# Patient Record
Sex: FEMALE | Race: WHITE | NOT HISPANIC OR LATINO | Employment: OTHER | URBAN - NONMETROPOLITAN AREA
[De-identification: names, ages, dates, MRNs, and addresses within clinical notes are randomized per-mention and may not be internally consistent; named-entity substitution may affect disease eponyms.]

---

## 2017-05-01 ENCOUNTER — OFFICE VISIT (OUTPATIENT)
Dept: CARDIOLOGY | Facility: CLINIC | Age: 57
End: 2017-05-01

## 2017-05-01 VITALS
SYSTOLIC BLOOD PRESSURE: 122 MMHG | BODY MASS INDEX: 26.79 KG/M2 | WEIGHT: 160.8 LBS | HEART RATE: 85 BPM | OXYGEN SATURATION: 98 % | HEIGHT: 65 IN | DIASTOLIC BLOOD PRESSURE: 81 MMHG

## 2017-05-01 DIAGNOSIS — R07.89 ATYPICAL CHEST PAIN: ICD-10-CM

## 2017-05-01 DIAGNOSIS — E78.2 MIXED HYPERLIPIDEMIA: ICD-10-CM

## 2017-05-01 DIAGNOSIS — R00.2 PALPITATIONS: Primary | ICD-10-CM

## 2017-05-01 DIAGNOSIS — Z72.0 TOBACCO USE: ICD-10-CM

## 2017-05-01 PROCEDURE — 99213 OFFICE O/P EST LOW 20 MIN: CPT | Performed by: INTERNAL MEDICINE

## 2017-05-01 RX ORDER — ESCITALOPRAM OXALATE 20 MG/1
20 TABLET ORAL DAILY
COMMUNITY
End: 2022-01-13 | Stop reason: ALTCHOICE

## 2017-05-03 PROBLEM — R00.2 PALPITATIONS: Status: ACTIVE | Noted: 2017-05-03

## 2017-10-24 ENCOUNTER — TELEPHONE (OUTPATIENT)
Dept: CARDIOLOGY | Facility: CLINIC | Age: 57
End: 2017-10-24

## 2017-10-24 NOTE — TELEPHONE ENCOUNTER
----- Message from Zia Coelho MD sent at 10/23/2017  1:44 PM EDT -----  It is okay to stop aspirin  ----- Message -----     From: Linda Mcintosh MA     Sent: 10/23/2017  11:51 AM       To: Zia Coelho MD    Pt is having hysterectomy done Dr. Topete office requesting that she stop asa x 7 days prior to procedure.            Attn to Krupa at Dr. Topete office

## 2017-10-26 ENCOUNTER — TELEPHONE (OUTPATIENT)
Dept: CARDIOLOGY | Facility: CLINIC | Age: 57
End: 2017-10-26

## 2017-10-26 DIAGNOSIS — R00.2 PALPITATIONS: Primary | ICD-10-CM

## 2017-10-26 NOTE — TELEPHONE ENCOUNTER
----- Message from Zia Coelho MD sent at 10/26/2017 11:43 AM EDT -----  Contact: [phone] Krupa ext [phone]  She will not need cardiac clearance but order an echo  ----- Message -----     From: Linda Mcintosh MA     Sent: 10/26/2017  10:40 AM       To: MD Dr. Yoselyn Awad office wanting to know if Ms. Concepcion will need cardiac clearance. She said pt told her that she has a leaky valve so they were requesting clearance but I didn't see it in her chart?

## 2017-11-06 ENCOUNTER — APPOINTMENT (OUTPATIENT)
Dept: PREADMISSION TESTING | Facility: HOSPITAL | Age: 57
End: 2017-11-06

## 2017-11-17 ENCOUNTER — HOSPITAL ENCOUNTER (OUTPATIENT)
Dept: CARDIOLOGY | Facility: HOSPITAL | Age: 57
Discharge: HOME | End: 2017-11-17
Attending: INTERNAL MEDICINE | Admitting: INTERNAL MEDICINE

## 2017-11-17 LAB
BH CV ECHO MEAS - % IVS THICK: 45.6 %
BH CV ECHO MEAS - % LVPW THICK: 90.1 %
BH CV ECHO MEAS - ACS: 2.3 CM
BH CV ECHO MEAS - AO ROOT AREA (BSA CORRECTED): 2.1
BH CV ECHO MEAS - AO ROOT AREA: 10.8 CM^2
BH CV ECHO MEAS - AO ROOT DIAM: 3.7 CM
BH CV ECHO MEAS - BSA(HAYCOCK): 1.8 M^2
BH CV ECHO MEAS - BSA: 1.8 M^2
BH CV ECHO MEAS - BZI_BMI: 26.6 KILOGRAMS/M^2
BH CV ECHO MEAS - BZI_METRIC_HEIGHT: 165.1 CM
BH CV ECHO MEAS - BZI_METRIC_WEIGHT: 72.6 KG
BH CV ECHO MEAS - CONTRAST EF 4CH: 58 ML/M^2
BH CV ECHO MEAS - EDV(CUBED): 114.4 ML
BH CV ECHO MEAS - EDV(MOD-SP4): 50 ML
BH CV ECHO MEAS - EDV(TEICH): 110.4 ML
BH CV ECHO MEAS - EF(CUBED): 74.1 %
BH CV ECHO MEAS - EF(MOD-SP4): 58 %
BH CV ECHO MEAS - EF(TEICH): 65.9 %
BH CV ECHO MEAS - ESV(CUBED): 29.6 ML
BH CV ECHO MEAS - ESV(MOD-SP4): 21 ML
BH CV ECHO MEAS - ESV(TEICH): 37.7 ML
BH CV ECHO MEAS - FS: 36.3 %
BH CV ECHO MEAS - IVS/LVPW: 1
BH CV ECHO MEAS - IVSD: 1.1 CM
BH CV ECHO MEAS - IVSS: 1.5 CM
BH CV ECHO MEAS - LA DIMENSION: 3.6 CM
BH CV ECHO MEAS - LA/AO: 0.97
BH CV ECHO MEAS - LV DIASTOLIC VOL/BSA (35-75): 27.8 ML/M^2
BH CV ECHO MEAS - LV MASS(C)D: 184.3 GRAMS
BH CV ECHO MEAS - LV MASS(C)DI: 102.5 GRAMS/M^2
BH CV ECHO MEAS - LV MASS(C)S: 215.3 GRAMS
BH CV ECHO MEAS - LV MASS(C)SI: 119.7 GRAMS/M^2
BH CV ECHO MEAS - LV SYSTOLIC VOL/BSA (12-30): 11.7 ML/M^2
BH CV ECHO MEAS - LVIDD: 4.9 CM
BH CV ECHO MEAS - LVIDS: 3.1 CM
BH CV ECHO MEAS - LVLD AP4: 7.6 CM
BH CV ECHO MEAS - LVLS AP4: 6.7 CM
BH CV ECHO MEAS - LVOT AREA (M): 2.8 CM^2
BH CV ECHO MEAS - LVOT AREA: 3 CM^2
BH CV ECHO MEAS - LVOT DIAM: 1.9 CM
BH CV ECHO MEAS - LVPWD: 1 CM
BH CV ECHO MEAS - LVPWS: 2 CM
BH CV ECHO MEAS - MV A MAX VEL: 57.8 CM/SEC
BH CV ECHO MEAS - MV E MAX VEL: 70.1 CM/SEC
BH CV ECHO MEAS - MV E/A: 1.2
BH CV ECHO MEAS - PA ACC SLOPE: 468.8 CM/SEC^2
BH CV ECHO MEAS - PA ACC TIME: 0.14 SEC
BH CV ECHO MEAS - PA PR(ACCEL): 15.6 MMHG
BH CV ECHO MEAS - RVDD: 1.9 CM
BH CV ECHO MEAS - SI(CUBED): 47.1 ML/M^2
BH CV ECHO MEAS - SI(MOD-SP4): 16.1 ML/M^2
BH CV ECHO MEAS - SI(TEICH): 40.4 ML/M^2
BH CV ECHO MEAS - SV(CUBED): 84.8 ML
BH CV ECHO MEAS - SV(MOD-SP4): 29 ML
BH CV ECHO MEAS - SV(TEICH): 72.7 ML
LV EF 2D ECHO EST: 60 %
MAXIMAL PREDICTED HEART RATE: 164 BPM
STRESS TARGET HR: 139 BPM

## 2017-11-17 PROCEDURE — 93306 TTE W/DOPPLER COMPLETE: CPT | Performed by: INTERNAL MEDICINE

## 2017-11-17 PROCEDURE — 93306 TTE W/DOPPLER COMPLETE: CPT

## 2017-11-20 ENCOUNTER — OFFICE VISIT (OUTPATIENT)
Dept: CARDIOLOGY | Facility: CLINIC | Age: 57
End: 2017-11-20

## 2017-11-20 VITALS
BODY MASS INDEX: 24.93 KG/M2 | HEART RATE: 74 BPM | WEIGHT: 149.6 LBS | SYSTOLIC BLOOD PRESSURE: 114 MMHG | HEIGHT: 65 IN | OXYGEN SATURATION: 95 % | DIASTOLIC BLOOD PRESSURE: 68 MMHG

## 2017-11-20 DIAGNOSIS — R00.2 PALPITATIONS: Primary | ICD-10-CM

## 2017-11-20 DIAGNOSIS — Z72.0 TOBACCO USE: ICD-10-CM

## 2017-11-20 DIAGNOSIS — R07.89 ATYPICAL CHEST PAIN: ICD-10-CM

## 2017-11-20 PROCEDURE — 99214 OFFICE O/P EST MOD 30 MIN: CPT | Performed by: INTERNAL MEDICINE

## 2017-11-20 RX ORDER — SERTRALINE HYDROCHLORIDE 25 MG/1
25 TABLET, FILM COATED ORAL DAILY
COMMUNITY
End: 2019-12-11 | Stop reason: ALTCHOICE

## 2017-11-20 NOTE — PROGRESS NOTES
subjective     Chief Complaint   Patient presents with   • Follow-up   • Hyperlipidemia     History of Present Illness  Patient is 57 years old white female who is here for 6 months cardiology follow-up.  Patient has no known coronary artery disease.  She had a stress test done in March 2016 which was negative for significant exercise-induced myocardial ischemia.  Patient has fast heartbeat and is taking metoprolol.  She sees be doing very well at this time and denies any palpitations.  Heart rate is around 70/m.  Blood pressure is also normal.    Patient continues to smoke about one half pack of cigarettes daily.  She has hyperlipidemia and is taking Pravachol daily and to directions of Dr. Rosario.      Past Surgical History:   Procedure Laterality Date   • CHOLECYSTECTOMY     • COLONOSCOPY  2014   • ENDOSCOPY  2014   • LUNG SURGERY      biopsy     Family History   Problem Relation Age of Onset   • Coronary artery disease Mother    • Heart attack Mother    • Diabetes Mother    • Heart disease Other    • Heart disease Father    • Hypertension Father    • Cancer Father    • No Known Problems Sister    • Diabetes Brother    • No Known Problems Sister    • No Known Problems Sister    • No Known Problems Sister    • Arthritis Brother      Past Medical History:   Diagnosis Date   • Ankle edema    • Asthmatic bronchitis    • Atypical chest pain    • COPD (chronic obstructive pulmonary disease)    • Family history of premature coronary artery disease    • GERD (gastroesophageal reflux disease)    • Hiatal hernia    • History of EKG 09/29/2015    NORMAL   • Hyperlipidemia    • Lung nodules    • Obesity    • Tobacco abuse      Patient Active Problem List   Diagnosis   • Hyperlipidemia   • GERD (gastroesophageal reflux disease)   • Tobacco use   • Obesity   • COPD (chronic obstructive pulmonary disease)   • Atypical chest pain   • Palpitations       Social History   Substance Use Topics   • Smoking status: Current Every Day  Smoker     Packs/day: 1.50   • Smokeless tobacco: None   • Alcohol use No       No Known Allergies    Current Outpatient Prescriptions on File Prior to Visit   Medication Sig   • albuterol (PROVENTIL HFA;VENTOLIN HFA) 108 (90 BASE) MCG/ACT inhaler Inhale 2 (two) times a day.   • alendronate (FOSAMAX) 70 MG tablet Take 1 tablet by mouth 1 (one) time per week.   • aspirin 81 MG tablet Take 1 tablet by mouth daily.   • diazepam (VALIUM) 5 MG tablet Take 5 mg by mouth 3 (three) times a day.   • diclofenac (VOLTAREN) 1 % gel gel Apply 4 g topically. Bid - tid prn   • escitalopram (LEXAPRO) 20 MG tablet Take 20 mg by mouth Daily.   • furosemide (LASIX) 20 MG tablet Take 10 mg by mouth daily as needed.   • gabapentin (NEURONTIN) 600 MG tablet Take 600 mg by mouth 2 (two) times a day.   • HYDROcodone-acetaminophen (NORCO)  MG per tablet Take 1 tablet by mouth 3 (three) times a day.   • hydrOXYzine (ATARAX) 25 MG tablet Take 25 mg by mouth every night.   • meloxicam (MOBIC) 15 MG tablet Take 15 mg by mouth daily.   • metoclopramide (REGLAN) 5 MG tablet Take 10 mg by mouth daily as needed.   • metoprolol tartrate (LOPRESSOR) 25 MG tablet take 1 tablet by mouth once daily   • montelukast (SINGULAIR) 10 MG tablet Take 10 mg by mouth every night.   • pantoprazole (PROTONIX) 40 MG EC tablet Take 40 mg by mouth daily.   • pravastatin (PRAVACHOL) 40 MG tablet Take 40 mg by mouth daily.   • PredniSONE (DELTASONE) 10 MG (21) tablet pack Take  by mouth Daily. Use as directed on package   • rOPINIRole (REQUIP) 0.25 MG tablet Take 0.25 mg by mouth every night. Take 1 hour before bedtime.   • vitamin B-12 (CYANOCOBALAMIN) 1000 MCG tablet Take 1,000 mcg by mouth daily.   • azithromycin (ZITHROMAX) 250 MG tablet Take 250 mg by mouth Daily. Take 2 tablets the first day, then 1 tablet daily for 4 days.     No current facility-administered medications on file prior to visit.          The following portions of the patient's history were  "reviewed and updated as appropriate: allergies, current medications, past family history, past medical history, past social history, past surgical history and problem list.    Review of Systems   Constitution: Negative.   HENT: Negative.  Negative for congestion.    Eyes: Negative.    Cardiovascular: Negative.  Negative for chest pain, cyanosis, dyspnea on exertion, irregular heartbeat, leg swelling, near-syncope, orthopnea, palpitations, paroxysmal nocturnal dyspnea and syncope.   Respiratory: Negative.  Negative for shortness of breath.    Hematologic/Lymphatic: Negative.    Musculoskeletal: Negative.    Gastrointestinal: Negative.    Neurological: Negative.  Negative for headaches.          Objective:     /68 (BP Location: Left arm, Patient Position: Sitting)  Pulse 74  Ht 65\" (165.1 cm)  Wt 149 lb 9.6 oz (67.9 kg)  SpO2 95%  BMI 24.89 kg/m2  Physical Exam   Constitutional: She appears well-developed and well-nourished. No distress.   HENT:   Head: Normocephalic and atraumatic.   Mouth/Throat: Oropharynx is clear and moist. No oropharyngeal exudate.   Eyes: Conjunctivae and EOM are normal. Pupils are equal, round, and reactive to light. No scleral icterus.   Neck: Normal range of motion. Neck supple. No JVD present. No tracheal deviation present. No thyromegaly present.   Cardiovascular: Normal rate, regular rhythm, normal heart sounds and intact distal pulses.  PMI is not displaced.  Exam reveals no gallop, no friction rub and no decreased pulses.    No murmur heard.  Pulses:       Carotid pulses are 3+ on the right side, and 3+ on the left side.       Radial pulses are 3+ on the right side, and 3+ on the left side.   Pulmonary/Chest: Effort normal and breath sounds normal. No respiratory distress. She has no wheezes. She has no rales. She exhibits no tenderness.   Abdominal: Soft. Bowel sounds are normal. She exhibits no distension, no abdominal bruit and no mass. There is no splenomegaly or " hepatomegaly. There is no tenderness. There is no rebound and no guarding.   Musculoskeletal: Normal range of motion. She exhibits no edema, tenderness or deformity.   Lymphadenopathy:     She has no cervical adenopathy.   Neurological: She is alert. She has normal reflexes. No cranial nerve deficit. She exhibits normal muscle tone. Coordination normal.   Skin: Skin is warm and dry. No rash noted. She is not diaphoretic. No erythema.   Psychiatric: She has a normal mood and affect. Her behavior is normal. Judgment and thought content normal.         Lab Review        ECG 12 Lead  Date/Time: 11/25/2017 1:45 PM  Performed by: JOSSUE PEREZ  Authorized by: JOSSUE PEREZ   Comparison: compared with previous ECG from 6/4/2016  Similar to previous ECG  Rhythm: sinus rhythm  Rate: normal  Conduction: conduction normal  ST Segments: ST segments normal  T Waves: T waves normal  QRS axis: normal  Other: no other findings  Clinical impression: normal ECG               I personally viewed and interpreted the patient's LAB data         Assessment:     1. Palpitations    2. Tobacco use    3. Atypical chest pain          Plan:     Patient has history of fast heartbeat.  Is very well controlled with low-dose Lopressor.  Patient had history of atypical chest pain in the past had cardiac workup with a normal stress test in March 2016.  Currently she is not having any symptoms and is doing very well.  EKG today is also normal.  Unfortunately she continues to smoke one half pack of cigarettes daily.    From cardiac standpoint she seems to be doing very well and is stable.  It is okay for her to stop aspirin prior to surgery.        Return in about 6 months (around 5/20/2018).

## 2017-11-25 PROCEDURE — 93000 ELECTROCARDIOGRAM COMPLETE: CPT | Performed by: INTERNAL MEDICINE

## 2018-01-09 ENCOUNTER — PREP FOR SURGERY (OUTPATIENT)
Dept: OTHER | Facility: HOSPITAL | Age: 58
End: 2018-01-09

## 2018-01-09 DIAGNOSIS — D27.1 OVARIAN TUMOR (BENIGN), LEFT: Primary | ICD-10-CM

## 2018-01-09 RX ORDER — SODIUM CHLORIDE 0.9 % (FLUSH) 0.9 %
1-10 SYRINGE (ML) INJECTION AS NEEDED
Status: CANCELLED | OUTPATIENT
Start: 2018-01-16

## 2018-01-11 ENCOUNTER — APPOINTMENT (OUTPATIENT)
Dept: PREADMISSION TESTING | Facility: HOSPITAL | Age: 58
End: 2018-01-11

## 2018-01-11 DIAGNOSIS — D27.1 OVARIAN TUMOR (BENIGN), LEFT: ICD-10-CM

## 2018-01-11 LAB
ABO GROUP BLD: NORMAL
ANION GAP SERPL CALCULATED.3IONS-SCNC: 3.3 MMOL/L (ref 3.6–11.2)
BASOPHILS # BLD AUTO: 0.1 10*3/MM3 (ref 0–0.3)
BASOPHILS NFR BLD AUTO: 1 % (ref 0–2)
BLD GP AB SCN SERPL QL: NEGATIVE
BUN BLD-MCNC: 6 MG/DL (ref 7–21)
BUN/CREAT SERPL: 7.6 (ref 7–25)
CALCIUM SPEC-SCNC: 9 MG/DL (ref 7.7–10)
CHLORIDE SERPL-SCNC: 107 MMOL/L (ref 99–112)
CO2 SERPL-SCNC: 30.7 MMOL/L (ref 24.3–31.9)
CREAT BLD-MCNC: 0.79 MG/DL (ref 0.43–1.29)
DEPRECATED RDW RBC AUTO: 46.9 FL (ref 37–54)
EOSINOPHIL # BLD AUTO: 0.23 10*3/MM3 (ref 0–0.7)
EOSINOPHIL NFR BLD AUTO: 2.2 % (ref 0–5)
ERYTHROCYTE [DISTWIDTH] IN BLOOD BY AUTOMATED COUNT: 14.4 % (ref 11.5–14.5)
GFR SERPL CREATININE-BSD FRML MDRD: 75 ML/MIN/1.73
GLUCOSE BLD-MCNC: 80 MG/DL (ref 70–110)
HCT VFR BLD AUTO: 42.3 % (ref 37–47)
HGB BLD-MCNC: 13.6 G/DL (ref 12–16)
IMM GRANULOCYTES # BLD: 0.02 10*3/MM3 (ref 0–0.03)
IMM GRANULOCYTES NFR BLD: 0.2 % (ref 0–0.5)
LYMPHOCYTES # BLD AUTO: 3.54 10*3/MM3 (ref 1–3)
LYMPHOCYTES NFR BLD AUTO: 33.8 % (ref 21–51)
MCH RBC QN AUTO: 29.8 PG (ref 27–33)
MCHC RBC AUTO-ENTMCNC: 32.2 G/DL (ref 33–37)
MCV RBC AUTO: 92.6 FL (ref 80–94)
MONOCYTES # BLD AUTO: 0.66 10*3/MM3 (ref 0.1–0.9)
MONOCYTES NFR BLD AUTO: 6.3 % (ref 0–10)
NEUTROPHILS # BLD AUTO: 5.91 10*3/MM3 (ref 1.4–6.5)
NEUTROPHILS NFR BLD AUTO: 56.5 % (ref 30–70)
OSMOLALITY SERPL CALC.SUM OF ELEC: 277.8 MOSM/KG (ref 273–305)
PLATELET # BLD AUTO: 263 10*3/MM3 (ref 130–400)
PMV BLD AUTO: 10.8 FL (ref 6–10)
POTASSIUM BLD-SCNC: 4.1 MMOL/L (ref 3.5–5.3)
RBC # BLD AUTO: 4.57 10*6/MM3 (ref 4.2–5.4)
RH BLD: POSITIVE
SODIUM BLD-SCNC: 141 MMOL/L (ref 135–153)
WBC NRBC COR # BLD: 10.46 10*3/MM3 (ref 4.5–12.5)

## 2018-01-11 PROCEDURE — 86900 BLOOD TYPING SEROLOGIC ABO: CPT | Performed by: OBSTETRICS & GYNECOLOGY

## 2018-01-11 PROCEDURE — 80048 BASIC METABOLIC PNL TOTAL CA: CPT | Performed by: OBSTETRICS & GYNECOLOGY

## 2018-01-11 PROCEDURE — 85025 COMPLETE CBC W/AUTO DIFF WBC: CPT | Performed by: OBSTETRICS & GYNECOLOGY

## 2018-01-11 PROCEDURE — 86850 RBC ANTIBODY SCREEN: CPT | Performed by: OBSTETRICS & GYNECOLOGY

## 2018-01-11 PROCEDURE — 86901 BLOOD TYPING SEROLOGIC RH(D): CPT | Performed by: OBSTETRICS & GYNECOLOGY

## 2018-01-11 PROCEDURE — 36415 COLL VENOUS BLD VENIPUNCTURE: CPT

## 2018-01-11 RX ORDER — LORATADINE 10 MG/1
10 TABLET ORAL DAILY
COMMUNITY
End: 2019-12-11 | Stop reason: ALTCHOICE

## 2018-01-11 RX ORDER — HYDROXYZINE PAMOATE 25 MG/1
25 CAPSULE ORAL 2 TIMES DAILY
COMMUNITY
End: 2019-06-26 | Stop reason: SDUPTHER

## 2018-01-11 RX ORDER — ERGOCALCIFEROL 1.25 MG/1
50000 CAPSULE ORAL
COMMUNITY
End: 2022-01-13 | Stop reason: ALTCHOICE

## 2018-01-11 RX ORDER — METOCLOPRAMIDE 10 MG/1
10 TABLET ORAL DAILY
COMMUNITY

## 2018-01-11 NOTE — DISCHARGE INSTRUCTIONS
TAKE the following medications the morning of surgery:  All heart or blood pressure medications    Please discontinue all blood thinners and anticoagulants (except aspirin) prior to surgery as per your surgeon and cardiologist instructions.  Aspirin may be continued up to the day prior to surgery.    HOLD all diabetic medications the morning of surgery as order by physician.    Please follow instructions on use of prep cloths provided by nurse. Return instruction sheet with stickers attached to pre-op nurse on day of surgery.    Arrival time for surgery on 1/16/2018 is 1030 am.    General Instructions:  • Do NOT eat or drink after midnight 1/15/2018 which includes water, mints, or gum.  • You may brush your teeth. Dental appliances that are removable must be taken out day of surgery.  • Do NOT smoke, chew tobacco, or drink alcohol within 24 hours prior to surgery.  • Bring medications in original bottles, any inhalers and if applicable your C-PAP/BI-PAP machine  • Bring any papers given to you in the doctor’s office  • Wear clean, comfortable clothes and socks  • Do NOT wear contact lenses or make-up or dark nail polish.  Bring a case for your glasses if applicable.  • Bring crutches or walker if applicable  • Leave all other valuables and jewelry at home  • If you were given a blood bank armband, continue to wear it until discharged.    Preventing a Surgical Site Infection:  • Shower on the morning of surgery using a fresh bar of anti-bacterial soap (such as Dial) and clean washcloth.  Dry with a clean towel and dress in clean clothing.  • For 2 to 3 days before surgery, avoid shaving with a razor near where you will have surgery because the razor can irritate skin and make it easier to develop an infection.  Ask your surgeon if you will be receiving antibiotics prior to surgery.  • Make sure you, your family, and all healthcare providers clean their hands with soap and water or an alcohol-based hand   before caring for you or your wound.  • If at all possible, quit smoking as many days before surgery as you can.    Day of Surgery:  Upon arrival, a pre-op nurse and anesthesiologist will review your health history, obtain vital signs, and answer questions you may have.  The only belongings needed at this time will be your home medications and if applicable you C-PAP/BI-PAP machine.  If you are staying overnight, your family can leave the rest of your belongings in the car and bring them to your room later.  A pre-op nurse will start an IV and you may receive medication in preparation for surgery.  Due to patient privacy and limited space, only one member of your family will be able to accompany you in the pre-op area.  While you are in surgery your family should notify the waiting room  if they leave the waiting room area and provide a contact number.  Please be aware that surgery does come with discomfort.  We want to make every effort to control your discomfort so please discuss any uncontrolled symptoms with your nurse.  Your doctor will most likely have prescribed pain medications.  If you are going home after surgery you will receive individualized written care instructions before being discharged.  A responsible adult must drive you to and from the hospital on the day of surgery and stay with you for 24 hours.  If you are staying overnight following surgery, you will be transported to your hospital room following the recovery period.

## 2018-01-16 ENCOUNTER — HOSPITAL ENCOUNTER (INPATIENT)
Facility: HOSPITAL | Age: 58
LOS: 1 days | Discharge: HOME | End: 2018-01-17
Attending: OBSTETRICS & GYNECOLOGY | Admitting: OBSTETRICS & GYNECOLOGY

## 2018-01-16 ENCOUNTER — ANESTHESIA EVENT (OUTPATIENT)
Dept: PERIOP | Facility: HOSPITAL | Age: 58
End: 2018-01-16

## 2018-01-16 ENCOUNTER — ANESTHESIA (OUTPATIENT)
Dept: PERIOP | Facility: HOSPITAL | Age: 58
End: 2018-01-16

## 2018-01-16 DIAGNOSIS — N93.9 ABNORMAL UTERINE BLEEDING (AUB): ICD-10-CM

## 2018-01-16 DIAGNOSIS — D27.1 OVARIAN TUMOR (BENIGN), LEFT: ICD-10-CM

## 2018-01-16 PROCEDURE — 0UT24ZZ RESECTION OF BILATERAL OVARIES, PERCUTANEOUS ENDOSCOPIC APPROACH: ICD-10-PCS | Performed by: OBSTETRICS & GYNECOLOGY

## 2018-01-16 PROCEDURE — 94799 UNLISTED PULMONARY SVC/PX: CPT

## 2018-01-16 PROCEDURE — 8E0W4CZ ROBOTIC ASSISTED PROCEDURE OF TRUNK REGION, PERCUTANEOUS ENDOSCOPIC APPROACH: ICD-10-PCS | Performed by: OBSTETRICS & GYNECOLOGY

## 2018-01-16 PROCEDURE — 25010000002 FENTANYL CITRATE (PF) 100 MCG/2ML SOLUTION: Performed by: NURSE ANESTHETIST, CERTIFIED REGISTERED

## 2018-01-16 PROCEDURE — 25010000002 ONDANSETRON PER 1 MG: Performed by: NURSE ANESTHETIST, CERTIFIED REGISTERED

## 2018-01-16 PROCEDURE — 88307 TISSUE EXAM BY PATHOLOGIST: CPT | Performed by: OBSTETRICS & GYNECOLOGY

## 2018-01-16 PROCEDURE — 0UT74ZZ RESECTION OF BILATERAL FALLOPIAN TUBES, PERCUTANEOUS ENDOSCOPIC APPROACH: ICD-10-PCS | Performed by: OBSTETRICS & GYNECOLOGY

## 2018-01-16 PROCEDURE — 25010000002 NEOSTIGMINE 10 MG/10ML SOLUTION: Performed by: NURSE ANESTHETIST, CERTIFIED REGISTERED

## 2018-01-16 PROCEDURE — 25010000002 HYDROMORPHONE PER 4 MG: Performed by: OBSTETRICS & GYNECOLOGY

## 2018-01-16 PROCEDURE — 0UT94ZZ RESECTION OF UTERUS, PERCUTANEOUS ENDOSCOPIC APPROACH: ICD-10-PCS | Performed by: OBSTETRICS & GYNECOLOGY

## 2018-01-16 PROCEDURE — 25010000002 KETOROLAC TROMETHAMINE PER 15 MG: Performed by: OBSTETRICS & GYNECOLOGY

## 2018-01-16 PROCEDURE — 25010000002 CEFOXITIN: Performed by: OBSTETRICS & GYNECOLOGY

## 2018-01-16 PROCEDURE — 25010000002 DEXAMETHASONE PER 1 MG: Performed by: NURSE ANESTHETIST, CERTIFIED REGISTERED

## 2018-01-16 PROCEDURE — 25010000002 PROPOFOL 10 MG/ML EMULSION: Performed by: NURSE ANESTHETIST, CERTIFIED REGISTERED

## 2018-01-16 RX ORDER — IPRATROPIUM BROMIDE AND ALBUTEROL SULFATE 2.5; .5 MG/3ML; MG/3ML
3 SOLUTION RESPIRATORY (INHALATION) ONCE AS NEEDED
Status: DISCONTINUED | OUTPATIENT
Start: 2018-01-16 | End: 2018-01-16 | Stop reason: HOSPADM

## 2018-01-16 RX ORDER — HYDROXYZINE HYDROCHLORIDE 25 MG/1
25 TABLET, FILM COATED ORAL 2 TIMES DAILY
Status: DISCONTINUED | OUTPATIENT
Start: 2018-01-16 | End: 2018-01-17 | Stop reason: HOSPADM

## 2018-01-16 RX ORDER — SODIUM CHLORIDE, SODIUM LACTATE, POTASSIUM CHLORIDE, CALCIUM CHLORIDE 600; 310; 30; 20 MG/100ML; MG/100ML; MG/100ML; MG/100ML
100 INJECTION, SOLUTION INTRAVENOUS CONTINUOUS
Status: DISCONTINUED | OUTPATIENT
Start: 2018-01-16 | End: 2018-01-17 | Stop reason: HOSPADM

## 2018-01-16 RX ORDER — ONDANSETRON 4 MG/1
4 TABLET, ORALLY DISINTEGRATING ORAL EVERY 6 HOURS PRN
Status: DISCONTINUED | OUTPATIENT
Start: 2018-01-16 | End: 2018-01-17 | Stop reason: HOSPADM

## 2018-01-16 RX ORDER — SODIUM CHLORIDE, SODIUM LACTATE, POTASSIUM CHLORIDE, CALCIUM CHLORIDE 600; 310; 30; 20 MG/100ML; MG/100ML; MG/100ML; MG/100ML
125 INJECTION, SOLUTION INTRAVENOUS CONTINUOUS
Status: DISCONTINUED | OUTPATIENT
Start: 2018-01-16 | End: 2018-01-16 | Stop reason: HOSPADM

## 2018-01-16 RX ORDER — LIDOCAINE HYDROCHLORIDE 20 MG/ML
INJECTION, SOLUTION INFILTRATION; PERINEURAL AS NEEDED
Status: DISCONTINUED | OUTPATIENT
Start: 2018-01-16 | End: 2018-01-16 | Stop reason: SURG

## 2018-01-16 RX ORDER — FUROSEMIDE 20 MG/1
20 TABLET ORAL DAILY
Status: DISCONTINUED | OUTPATIENT
Start: 2018-01-16 | End: 2018-01-17 | Stop reason: HOSPADM

## 2018-01-16 RX ORDER — METOCLOPRAMIDE 10 MG/1
10 TABLET ORAL DAILY
Status: CANCELLED | OUTPATIENT
Start: 2018-01-16

## 2018-01-16 RX ORDER — MONTELUKAST SODIUM 10 MG/1
10 TABLET ORAL NIGHTLY
Status: CANCELLED | OUTPATIENT
Start: 2018-01-16

## 2018-01-16 RX ORDER — LANOLIN ALCOHOL/MO/W.PET/CERES
1000 CREAM (GRAM) TOPICAL DAILY
Status: DISCONTINUED | OUTPATIENT
Start: 2018-01-16 | End: 2018-01-17 | Stop reason: HOSPADM

## 2018-01-16 RX ORDER — MEPERIDINE HYDROCHLORIDE 25 MG/ML
12.5 INJECTION INTRAMUSCULAR; INTRAVENOUS; SUBCUTANEOUS
Status: DISCONTINUED | OUTPATIENT
Start: 2018-01-16 | End: 2018-01-16 | Stop reason: HOSPADM

## 2018-01-16 RX ORDER — BUDESONIDE AND FORMOTEROL FUMARATE DIHYDRATE 160; 4.5 UG/1; UG/1
2 AEROSOL RESPIRATORY (INHALATION)
Status: CANCELLED | OUTPATIENT
Start: 2018-01-16

## 2018-01-16 RX ORDER — ESCITALOPRAM OXALATE 10 MG/1
20 TABLET ORAL DAILY
Status: DISCONTINUED | OUTPATIENT
Start: 2018-01-16 | End: 2018-01-17 | Stop reason: HOSPADM

## 2018-01-16 RX ORDER — NALOXONE HCL 0.4 MG/ML
0.1 VIAL (ML) INJECTION
Status: DISCONTINUED | OUTPATIENT
Start: 2018-01-16 | End: 2018-01-17 | Stop reason: HOSPADM

## 2018-01-16 RX ORDER — FENTANYL CITRATE 50 UG/ML
50 INJECTION, SOLUTION INTRAMUSCULAR; INTRAVENOUS
Status: DISCONTINUED | OUTPATIENT
Start: 2018-01-16 | End: 2018-01-16 | Stop reason: HOSPADM

## 2018-01-16 RX ORDER — MELOXICAM 7.5 MG/1
15 TABLET ORAL DAILY
Status: DISCONTINUED | OUTPATIENT
Start: 2018-01-16 | End: 2018-01-17 | Stop reason: HOSPADM

## 2018-01-16 RX ORDER — HYDROMORPHONE HYDROCHLORIDE 1 MG/ML
0.5 INJECTION, SOLUTION INTRAMUSCULAR; INTRAVENOUS; SUBCUTANEOUS
Status: DISCONTINUED | OUTPATIENT
Start: 2018-01-16 | End: 2018-01-17 | Stop reason: HOSPADM

## 2018-01-16 RX ORDER — BUDESONIDE AND FORMOTEROL FUMARATE DIHYDRATE 160; 4.5 UG/1; UG/1
2 AEROSOL RESPIRATORY (INHALATION)
Status: DISCONTINUED | OUTPATIENT
Start: 2018-01-16 | End: 2018-01-17 | Stop reason: HOSPADM

## 2018-01-16 RX ORDER — GLYCOPYRROLATE 0.2 MG/ML
INJECTION INTRAMUSCULAR; INTRAVENOUS AS NEEDED
Status: DISCONTINUED | OUTPATIENT
Start: 2018-01-16 | End: 2018-01-16 | Stop reason: SURG

## 2018-01-16 RX ORDER — ATORVASTATIN CALCIUM 10 MG/1
10 TABLET, FILM COATED ORAL NIGHTLY
Status: DISCONTINUED | OUTPATIENT
Start: 2018-01-16 | End: 2018-01-17 | Stop reason: HOSPADM

## 2018-01-16 RX ORDER — KETOROLAC TROMETHAMINE 30 MG/ML
15 INJECTION, SOLUTION INTRAMUSCULAR; INTRAVENOUS EVERY 6 HOURS PRN
Status: DISCONTINUED | OUTPATIENT
Start: 2018-01-16 | End: 2018-01-17 | Stop reason: HOSPADM

## 2018-01-16 RX ORDER — PANTOPRAZOLE SODIUM 40 MG/1
40 TABLET, DELAYED RELEASE ORAL 2 TIMES DAILY
Status: DISCONTINUED | OUTPATIENT
Start: 2018-01-16 | End: 2018-01-17 | Stop reason: HOSPADM

## 2018-01-16 RX ORDER — ALBUTEROL SULFATE 90 UG/1
2 AEROSOL, METERED RESPIRATORY (INHALATION) EVERY 6 HOURS PRN
Status: CANCELLED | OUTPATIENT
Start: 2018-01-16

## 2018-01-16 RX ORDER — OXYCODONE HYDROCHLORIDE AND ACETAMINOPHEN 5; 325 MG/1; MG/1
1 TABLET ORAL ONCE AS NEEDED
Status: DISCONTINUED | OUTPATIENT
Start: 2018-01-16 | End: 2018-01-16 | Stop reason: HOSPADM

## 2018-01-16 RX ORDER — GABAPENTIN 300 MG/1
600 CAPSULE ORAL EVERY 8 HOURS SCHEDULED
Status: DISCONTINUED | OUTPATIENT
Start: 2018-01-16 | End: 2018-01-17 | Stop reason: HOSPADM

## 2018-01-16 RX ORDER — PROPOFOL 10 MG/ML
VIAL (ML) INTRAVENOUS AS NEEDED
Status: DISCONTINUED | OUTPATIENT
Start: 2018-01-16 | End: 2018-01-16 | Stop reason: SURG

## 2018-01-16 RX ORDER — ONDANSETRON 4 MG/1
4 TABLET, FILM COATED ORAL EVERY 6 HOURS PRN
Status: DISCONTINUED | OUTPATIENT
Start: 2018-01-16 | End: 2018-01-17 | Stop reason: HOSPADM

## 2018-01-16 RX ORDER — ASPIRIN 81 MG/1
81 TABLET ORAL DAILY
Status: DISCONTINUED | OUTPATIENT
Start: 2018-01-17 | End: 2018-01-17 | Stop reason: HOSPADM

## 2018-01-16 RX ORDER — PANTOPRAZOLE SODIUM 40 MG/1
40 TABLET, DELAYED RELEASE ORAL 2 TIMES DAILY
Status: CANCELLED | OUTPATIENT
Start: 2018-01-16

## 2018-01-16 RX ORDER — FENTANYL CITRATE 50 UG/ML
INJECTION, SOLUTION INTRAMUSCULAR; INTRAVENOUS AS NEEDED
Status: DISCONTINUED | OUTPATIENT
Start: 2018-01-16 | End: 2018-01-16 | Stop reason: SURG

## 2018-01-16 RX ORDER — SODIUM CHLORIDE 0.9 % (FLUSH) 0.9 %
1-10 SYRINGE (ML) INJECTION AS NEEDED
Status: DISCONTINUED | OUTPATIENT
Start: 2018-01-16 | End: 2018-01-16 | Stop reason: HOSPADM

## 2018-01-16 RX ORDER — HYDROCODONE BITARTRATE AND ACETAMINOPHEN 10; 325 MG/1; MG/1
1 TABLET ORAL 3 TIMES DAILY
Status: CANCELLED | OUTPATIENT
Start: 2018-01-16

## 2018-01-16 RX ORDER — ATORVASTATIN CALCIUM 10 MG/1
10 TABLET, FILM COATED ORAL DAILY
Status: CANCELLED | OUTPATIENT
Start: 2018-01-16

## 2018-01-16 RX ORDER — ONDANSETRON 2 MG/ML
4 INJECTION INTRAMUSCULAR; INTRAVENOUS EVERY 6 HOURS PRN
Status: DISCONTINUED | OUTPATIENT
Start: 2018-01-16 | End: 2018-01-17 | Stop reason: HOSPADM

## 2018-01-16 RX ORDER — CETIRIZINE HYDROCHLORIDE 10 MG/1
10 TABLET ORAL DAILY
Status: DISCONTINUED | OUTPATIENT
Start: 2018-01-16 | End: 2018-01-17 | Stop reason: HOSPADM

## 2018-01-16 RX ORDER — LIDOCAINE HYDROCHLORIDE AND EPINEPHRINE 10; 10 MG/ML; UG/ML
INJECTION, SOLUTION INFILTRATION; PERINEURAL AS NEEDED
Status: DISCONTINUED | OUTPATIENT
Start: 2018-01-16 | End: 2018-01-16 | Stop reason: HOSPADM

## 2018-01-16 RX ORDER — FAMOTIDINE 10 MG/ML
INJECTION, SOLUTION INTRAVENOUS AS NEEDED
Status: DISCONTINUED | OUTPATIENT
Start: 2018-01-16 | End: 2018-01-16 | Stop reason: SURG

## 2018-01-16 RX ORDER — TRAMADOL HYDROCHLORIDE 50 MG/1
50 TABLET ORAL EVERY 6 HOURS PRN
Status: DISCONTINUED | OUTPATIENT
Start: 2018-01-16 | End: 2018-01-17 | Stop reason: HOSPADM

## 2018-01-16 RX ORDER — VECURONIUM BROMIDE 1 MG/ML
INJECTION, POWDER, LYOPHILIZED, FOR SOLUTION INTRAVENOUS AS NEEDED
Status: DISCONTINUED | OUTPATIENT
Start: 2018-01-16 | End: 2018-01-16 | Stop reason: SURG

## 2018-01-16 RX ORDER — MAGNESIUM HYDROXIDE 1200 MG/15ML
LIQUID ORAL AS NEEDED
Status: DISCONTINUED | OUTPATIENT
Start: 2018-01-16 | End: 2018-01-16 | Stop reason: HOSPADM

## 2018-01-16 RX ORDER — DEXAMETHASONE SODIUM PHOSPHATE 10 MG/ML
INJECTION INTRAMUSCULAR; INTRAVENOUS AS NEEDED
Status: DISCONTINUED | OUTPATIENT
Start: 2018-01-16 | End: 2018-01-16 | Stop reason: SURG

## 2018-01-16 RX ORDER — FUROSEMIDE 20 MG/1
20 TABLET ORAL DAILY
Status: CANCELLED | OUTPATIENT
Start: 2018-01-16

## 2018-01-16 RX ORDER — ONDANSETRON 2 MG/ML
INJECTION INTRAMUSCULAR; INTRAVENOUS AS NEEDED
Status: DISCONTINUED | OUTPATIENT
Start: 2018-01-16 | End: 2018-01-16 | Stop reason: SURG

## 2018-01-16 RX ORDER — ALBUTEROL SULFATE 90 UG/1
AEROSOL, METERED RESPIRATORY (INHALATION) AS NEEDED
Status: DISCONTINUED | OUTPATIENT
Start: 2018-01-16 | End: 2018-01-16 | Stop reason: SURG

## 2018-01-16 RX ORDER — MELOXICAM 7.5 MG/1
15 TABLET ORAL DAILY
Status: CANCELLED | OUTPATIENT
Start: 2018-01-16

## 2018-01-16 RX ORDER — ESCITALOPRAM OXALATE 10 MG/1
20 TABLET ORAL DAILY
Status: CANCELLED | OUTPATIENT
Start: 2018-01-16

## 2018-01-16 RX ORDER — GABAPENTIN 300 MG/1
600 CAPSULE ORAL EVERY 8 HOURS SCHEDULED
Status: CANCELLED | OUTPATIENT
Start: 2018-01-16

## 2018-01-16 RX ORDER — SERTRALINE HYDROCHLORIDE 25 MG/1
25 TABLET, FILM COATED ORAL DAILY
Status: CANCELLED | OUTPATIENT
Start: 2018-01-16

## 2018-01-16 RX ORDER — LANOLIN ALCOHOL/MO/W.PET/CERES
1000 CREAM (GRAM) TOPICAL DAILY
Status: CANCELLED | OUTPATIENT
Start: 2018-01-16

## 2018-01-16 RX ORDER — ONDANSETRON 2 MG/ML
4 INJECTION INTRAMUSCULAR; INTRAVENOUS ONCE AS NEEDED
Status: DISCONTINUED | OUTPATIENT
Start: 2018-01-16 | End: 2018-01-16 | Stop reason: HOSPADM

## 2018-01-16 RX ORDER — DIAZEPAM 5 MG/1
5 TABLET ORAL 3 TIMES DAILY
Status: DISCONTINUED | OUTPATIENT
Start: 2018-01-16 | End: 2018-01-17 | Stop reason: HOSPADM

## 2018-01-16 RX ORDER — HYDROCODONE BITARTRATE AND ACETAMINOPHEN 10; 325 MG/1; MG/1
1 TABLET ORAL 3 TIMES DAILY
Status: DISCONTINUED | OUTPATIENT
Start: 2018-01-16 | End: 2018-01-17 | Stop reason: HOSPADM

## 2018-01-16 RX ORDER — MONTELUKAST SODIUM 10 MG/1
10 TABLET ORAL NIGHTLY
Status: DISCONTINUED | OUTPATIENT
Start: 2018-01-16 | End: 2018-01-17 | Stop reason: HOSPADM

## 2018-01-16 RX ORDER — HYDROXYZINE HYDROCHLORIDE 25 MG/1
25 TABLET, FILM COATED ORAL 2 TIMES DAILY
Status: CANCELLED | OUTPATIENT
Start: 2018-01-16

## 2018-01-16 RX ORDER — DIAZEPAM 5 MG/1
5 TABLET ORAL 3 TIMES DAILY
Status: CANCELLED | OUTPATIENT
Start: 2018-01-16

## 2018-01-16 RX ORDER — SODIUM CHLORIDE 9 MG/ML
INJECTION, SOLUTION INTRAVENOUS AS NEEDED
Status: DISCONTINUED | OUTPATIENT
Start: 2018-01-16 | End: 2018-01-16 | Stop reason: HOSPADM

## 2018-01-16 RX ORDER — NEOSTIGMINE METHYLSULFATE 1 MG/ML
INJECTION, SOLUTION INTRAVENOUS AS NEEDED
Status: DISCONTINUED | OUTPATIENT
Start: 2018-01-16 | End: 2018-01-16 | Stop reason: SURG

## 2018-01-16 RX ORDER — SERTRALINE HYDROCHLORIDE 25 MG/1
25 TABLET, FILM COATED ORAL DAILY
Status: DISCONTINUED | OUTPATIENT
Start: 2018-01-16 | End: 2018-01-17 | Stop reason: HOSPADM

## 2018-01-16 RX ORDER — BUPIVACAINE HYDROCHLORIDE AND EPINEPHRINE 5; 5 MG/ML; UG/ML
INJECTION, SOLUTION EPIDURAL; INTRACAUDAL; PERINEURAL AS NEEDED
Status: DISCONTINUED | OUTPATIENT
Start: 2018-01-16 | End: 2018-01-16 | Stop reason: HOSPADM

## 2018-01-16 RX ORDER — CETIRIZINE HYDROCHLORIDE 10 MG/1
10 TABLET ORAL DAILY
Status: CANCELLED | OUTPATIENT
Start: 2018-01-16

## 2018-01-16 RX ORDER — METOCLOPRAMIDE 10 MG/1
10 TABLET ORAL DAILY
Status: DISCONTINUED | OUTPATIENT
Start: 2018-01-16 | End: 2018-01-17 | Stop reason: HOSPADM

## 2018-01-16 RX ORDER — ALBUTEROL SULFATE 2.5 MG/3ML
2.5 SOLUTION RESPIRATORY (INHALATION) EVERY 6 HOURS PRN
Status: DISCONTINUED | OUTPATIENT
Start: 2018-01-16 | End: 2018-01-17 | Stop reason: HOSPADM

## 2018-01-16 RX ADMIN — HYDROMORPHONE HYDROCHLORIDE 0.5 MG: 1 INJECTION, SOLUTION INTRAMUSCULAR; INTRAVENOUS; SUBCUTANEOUS at 12:52

## 2018-01-16 RX ADMIN — FUROSEMIDE 20 MG: 20 TABLET ORAL at 14:26

## 2018-01-16 RX ADMIN — SODIUM CHLORIDE, POTASSIUM CHLORIDE, SODIUM LACTATE AND CALCIUM CHLORIDE: 600; 310; 30; 20 INJECTION, SOLUTION INTRAVENOUS at 11:10

## 2018-01-16 RX ADMIN — PROPOFOL 150 MG: 10 INJECTION, EMULSION INTRAVENOUS at 10:22

## 2018-01-16 RX ADMIN — DIAZEPAM 5 MG: 5 TABLET ORAL at 16:11

## 2018-01-16 RX ADMIN — CETIRIZINE HYDROCHLORIDE 10 MG: 10 TABLET ORAL at 14:27

## 2018-01-16 RX ADMIN — KETOROLAC TROMETHAMINE 15 MG: 30 INJECTION, SOLUTION INTRAMUSCULAR; INTRAVENOUS at 12:52

## 2018-01-16 RX ADMIN — FENTANYL CITRATE 100 MCG: 50 INJECTION INTRAMUSCULAR; INTRAVENOUS at 10:22

## 2018-01-16 RX ADMIN — ATORVASTATIN CALCIUM 10 MG: 10 TABLET, FILM COATED ORAL at 20:19

## 2018-01-16 RX ADMIN — HYDROCODONE BITARTRATE AND ACETAMINOPHEN 1 TABLET: 10; 325 TABLET ORAL at 20:18

## 2018-01-16 RX ADMIN — FENTANYL CITRATE 50 MCG: 50 INJECTION INTRAMUSCULAR; INTRAVENOUS at 11:50

## 2018-01-16 RX ADMIN — FAMOTIDINE 20 MG: 10 INJECTION, SOLUTION INTRAVENOUS at 10:25

## 2018-01-16 RX ADMIN — ESCITALOPRAM 20 MG: 10 TABLET, FILM COATED ORAL at 14:26

## 2018-01-16 RX ADMIN — DEXAMETHASONE SODIUM PHOSPHATE 8 MG: 10 INJECTION INTRAMUSCULAR; INTRAVENOUS at 10:25

## 2018-01-16 RX ADMIN — GLYCOPYRROLATE 0.6 MG: 0.2 INJECTION, SOLUTION INTRAMUSCULAR; INTRAVENOUS at 11:08

## 2018-01-16 RX ADMIN — HYDROXYZINE 25 MG: 25 TABLET, FILM COATED ORAL at 14:26

## 2018-01-16 RX ADMIN — FENTANYL CITRATE 50 MCG: 50 INJECTION INTRAMUSCULAR; INTRAVENOUS at 10:55

## 2018-01-16 RX ADMIN — GABAPENTIN 600 MG: 300 CAPSULE ORAL at 14:27

## 2018-01-16 RX ADMIN — GABAPENTIN 600 MG: 300 CAPSULE ORAL at 20:19

## 2018-01-16 RX ADMIN — PANTOPRAZOLE SODIUM 40 MG: 40 TABLET, DELAYED RELEASE ORAL at 20:19

## 2018-01-16 RX ADMIN — VECURONIUM BROMIDE 4 MG: 1 INJECTION, POWDER, LYOPHILIZED, FOR SOLUTION INTRAVENOUS at 10:22

## 2018-01-16 RX ADMIN — HYDROCODONE BITARTRATE AND ACETAMINOPHEN 1 TABLET: 10; 325 TABLET ORAL at 16:11

## 2018-01-16 RX ADMIN — Medication 1000 MCG: at 14:26

## 2018-01-16 RX ADMIN — FENTANYL CITRATE 50 MCG: 50 INJECTION INTRAMUSCULAR; INTRAVENOUS at 11:40

## 2018-01-16 RX ADMIN — DIAZEPAM 5 MG: 5 TABLET ORAL at 20:18

## 2018-01-16 RX ADMIN — MONTELUKAST SODIUM 10 MG: 10 TABLET ORAL at 20:18

## 2018-01-16 RX ADMIN — CEFOXITIN 2 G: 2 INJECTION, POWDER, FOR SOLUTION INTRAVENOUS at 10:15

## 2018-01-16 RX ADMIN — LIDOCAINE HYDROCHLORIDE 40 MG: 20 INJECTION, SOLUTION INFILTRATION; PERINEURAL at 10:22

## 2018-01-16 RX ADMIN — ONDANSETRON 4 MG: 2 INJECTION, SOLUTION INTRAMUSCULAR; INTRAVENOUS at 10:25

## 2018-01-16 RX ADMIN — HYDROXYZINE 25 MG: 25 TABLET, FILM COATED ORAL at 20:18

## 2018-01-16 RX ADMIN — FENTANYL CITRATE 50 MCG: 50 INJECTION INTRAMUSCULAR; INTRAVENOUS at 10:48

## 2018-01-16 RX ADMIN — FENTANYL CITRATE 50 MCG: 50 INJECTION INTRAMUSCULAR; INTRAVENOUS at 11:08

## 2018-01-16 RX ADMIN — SERTRALINE HYDROCHLORIDE 25 MG: 25 TABLET ORAL at 14:26

## 2018-01-16 RX ADMIN — NEOSTIGMINE METHYLSULFATE 3 MG: 1 INJECTION, SOLUTION INTRAVENOUS at 11:08

## 2018-01-16 RX ADMIN — ALBUTEROL SULFATE 3 PUFF: 90 AEROSOL, METERED RESPIRATORY (INHALATION) at 10:15

## 2018-01-16 RX ADMIN — SODIUM CHLORIDE, POTASSIUM CHLORIDE, SODIUM LACTATE AND CALCIUM CHLORIDE 125 ML/HR: 600; 310; 30; 20 INJECTION, SOLUTION INTRAVENOUS at 09:26

## 2018-01-16 RX ADMIN — METOCLOPRAMIDE 10 MG: 10 TABLET ORAL at 14:26

## 2018-01-16 RX ADMIN — ALBUTEROL SULFATE 3 PUFF: 90 AEROSOL, METERED RESPIRATORY (INHALATION) at 11:20

## 2018-01-16 NOTE — ANESTHESIA POSTPROCEDURE EVALUATION
Patient: Alanna Concepcion    Procedure Summary     Date Anesthesia Start Anesthesia Stop Room / Location    01/16/18 1015 1125 [facility] / [facility]       Procedure Diagnosis Surgeon Provider    ROBOTIC TOTAL LAPAROSCOPIC HYSTERECTOMY WITH BILATERAL SALPINGO OOPHORECTOMY (N/A Abdomen) (D49.59) MD Bart Holland MD          Anesthesia Type: general  Last vitals  BP   141/75 (01/16/18 1155)   Temp   97.2 °F (36.2 °C) (01/16/18 1155)   Pulse   65 (01/16/18 1155)   Resp   16 (01/16/18 1155)     SpO2   99 % (01/16/18 1155)     Post Anesthesia Care and Evaluation    Patient location during evaluation: bedside  Patient participation: complete - patient participated  Level of consciousness: awake and alert  Pain score: 1  Pain management: adequate  Airway patency: patent  Anesthetic complications: No anesthetic complications  PONV Status: none  Cardiovascular status: acceptable  Respiratory status: acceptable  Hydration status: acceptable

## 2018-01-16 NOTE — ANESTHESIA PREPROCEDURE EVALUATION
Anesthesia Evaluation     no history of anesthetic complications:  NPO Solid Status: > 8 hours  NPO Liquid Status: > 8 hours     Airway   Mallampati: II  TM distance: >3 FB  Neck ROM: full  no difficulty expected  Dental - normal exam     Pulmonary - normal exam   (+) COPD, asthma,   Cardiovascular - normal exam    (+) hypertension, hyperlipidemia      Neuro/Psych  GI/Hepatic/Renal/Endo    (+) obesity,  hiatal hernia, GERD,     Musculoskeletal     Abdominal  - normal exam   Substance History      OB/GYN          Other                                                Anesthesia Plan    ASA 3     general     intravenous induction   Anesthetic plan and risks discussed with patient.

## 2018-01-16 NOTE — ANESTHESIA PROCEDURE NOTES
Airway  Airway not difficult    General Information and Staff    CRNA: GREG BAKER    Indications and Patient Condition  Indications for airway management: airway protection    Preoxygenated: yes  Mask difficulty assessment: 1 - vent by mask    Final Airway Details  Final airway type: endotracheal airway      Successful airway: ETT  Cuffed: yes   Successful intubation technique: direct laryngoscopy  Endotracheal tube insertion site: oral  Blade: Katie  Blade size: #3  ETT size: 7.0 mm  Cormack-Lehane Classification: grade I - full view of glottis  Placement verified by: chest auscultation and capnometry   Measured from: lips  ETT to lips (cm): 20  Number of attempts at approach: 1

## 2018-01-17 VITALS
WEIGHT: 142 LBS | HEIGHT: 64 IN | DIASTOLIC BLOOD PRESSURE: 69 MMHG | HEART RATE: 62 BPM | OXYGEN SATURATION: 96 % | SYSTOLIC BLOOD PRESSURE: 129 MMHG | BODY MASS INDEX: 24.24 KG/M2 | RESPIRATION RATE: 18 BRPM | TEMPERATURE: 97.4 F

## 2018-01-17 LAB
DEPRECATED RDW RBC AUTO: 47 FL (ref 37–54)
ERYTHROCYTE [DISTWIDTH] IN BLOOD BY AUTOMATED COUNT: 14.3 % (ref 11.5–14.5)
HCT VFR BLD AUTO: 39.1 % (ref 37–47)
HGB BLD-MCNC: 12.4 G/DL (ref 12–16)
MCH RBC QN AUTO: 29.7 PG (ref 27–33)
MCHC RBC AUTO-ENTMCNC: 31.7 G/DL (ref 33–37)
MCV RBC AUTO: 93.8 FL (ref 80–94)
PLATELET # BLD AUTO: 199 10*3/MM3 (ref 130–400)
PMV BLD AUTO: 11.4 FL (ref 6–10)
RBC # BLD AUTO: 4.17 10*6/MM3 (ref 4.2–5.4)
WBC NRBC COR # BLD: 15.79 10*3/MM3 (ref 4.5–12.5)

## 2018-01-17 PROCEDURE — 85027 COMPLETE CBC AUTOMATED: CPT | Performed by: OBSTETRICS & GYNECOLOGY

## 2018-01-17 PROCEDURE — 94799 UNLISTED PULMONARY SVC/PX: CPT

## 2018-01-17 RX ORDER — DOCUSATE SODIUM 100 MG/1
100 CAPSULE, LIQUID FILLED ORAL 2 TIMES DAILY PRN
Qty: 60 CAPSULE | Refills: 0 | Status: SHIPPED | OUTPATIENT
Start: 2018-01-17 | End: 2018-02-16

## 2018-01-17 RX ORDER — OXYCODONE HYDROCHLORIDE AND ACETAMINOPHEN 5; 325 MG/1; MG/1
1 TABLET ORAL EVERY 4 HOURS PRN
Qty: 40 TABLET | Refills: 0 | Status: SHIPPED | OUTPATIENT
Start: 2018-01-17 | End: 2018-05-21

## 2018-01-17 RX ORDER — IBUPROFEN 600 MG/1
600 TABLET ORAL EVERY 6 HOURS PRN
Qty: 30 TABLET | Refills: 0 | Status: SHIPPED | OUTPATIENT
Start: 2018-01-17 | End: 2018-02-16

## 2018-01-17 RX ORDER — ESTRADIOL 2 MG/1
2 TABLET ORAL DAILY
Qty: 100 TABLET | Refills: 3 | Status: SHIPPED | OUTPATIENT
Start: 2018-01-17

## 2018-01-17 RX ADMIN — FUROSEMIDE 20 MG: 20 TABLET ORAL at 11:10

## 2018-01-17 RX ADMIN — MELOXICAM 15 MG: 7.5 TABLET ORAL at 08:06

## 2018-01-17 RX ADMIN — ESCITALOPRAM 20 MG: 10 TABLET, FILM COATED ORAL at 08:06

## 2018-01-17 RX ADMIN — Medication 1000 MCG: at 08:05

## 2018-01-17 RX ADMIN — HYDROXYZINE 25 MG: 25 TABLET, FILM COATED ORAL at 08:06

## 2018-01-17 RX ADMIN — CETIRIZINE HYDROCHLORIDE 10 MG: 10 TABLET ORAL at 08:06

## 2018-01-17 RX ADMIN — ASPIRIN 81 MG: 81 TABLET ORAL at 08:06

## 2018-01-17 RX ADMIN — HYDROCODONE BITARTRATE AND ACETAMINOPHEN 1 TABLET: 10; 325 TABLET ORAL at 08:05

## 2018-01-17 RX ADMIN — PANTOPRAZOLE SODIUM 40 MG: 40 TABLET, DELAYED RELEASE ORAL at 08:05

## 2018-01-17 RX ADMIN — SERTRALINE HYDROCHLORIDE 25 MG: 25 TABLET ORAL at 08:05

## 2018-01-17 RX ADMIN — METOCLOPRAMIDE 10 MG: 10 TABLET ORAL at 08:05

## 2018-01-17 RX ADMIN — DIAZEPAM 5 MG: 5 TABLET ORAL at 08:05

## 2018-01-17 RX ADMIN — GABAPENTIN 600 MG: 300 CAPSULE ORAL at 05:36

## 2018-01-22 LAB
LAB AP CASE REPORT: NORMAL
Lab: NORMAL
PATH REPORT.FINAL DX SPEC: NORMAL

## 2018-05-21 ENCOUNTER — OFFICE VISIT (OUTPATIENT)
Dept: CARDIOLOGY | Facility: CLINIC | Age: 58
End: 2018-05-21

## 2018-05-21 VITALS
SYSTOLIC BLOOD PRESSURE: 118 MMHG | OXYGEN SATURATION: 98 % | HEIGHT: 65 IN | RESPIRATION RATE: 18 BRPM | BODY MASS INDEX: 24.12 KG/M2 | DIASTOLIC BLOOD PRESSURE: 64 MMHG | HEART RATE: 82 BPM | WEIGHT: 144.8 LBS

## 2018-05-21 DIAGNOSIS — E78.2 MIXED HYPERLIPIDEMIA: ICD-10-CM

## 2018-05-21 DIAGNOSIS — R07.89 ATYPICAL CHEST PAIN: ICD-10-CM

## 2018-05-21 DIAGNOSIS — Z72.0 TOBACCO USE: ICD-10-CM

## 2018-05-21 DIAGNOSIS — R00.2 PALPITATIONS: Primary | ICD-10-CM

## 2018-05-21 PROCEDURE — 99213 OFFICE O/P EST LOW 20 MIN: CPT | Performed by: INTERNAL MEDICINE

## 2018-05-21 RX ORDER — FLUTICASONE PROPIONATE 50 MCG
SPRAY, SUSPENSION (ML) NASAL
COMMUNITY
Start: 2017-07-20

## 2018-05-21 NOTE — PROGRESS NOTES
subjective     Chief Complaint   Patient presents with   • Atypical chest pain     states has symptom only when gets anxious,    • Palpitations     none since last office visit, doing well, no complaints    • Medication list     Verbal     History of Present Illness  Patient is 58 years old white female who states that she has been doing very well.  She states that when she is anxious she gets chest discomfort but it is not related to physical exertion.  Physically she can do anything she wants to without any problems.  Patient is taking aspirin regularly.  Patient had a stress test done 4 years ago which was negative for ischemia and a normal echo 7 months ago    She has had no further palpitations since last visit.  Patient has COPD and is taking Advair.  She seems to be doing very well with current medications.  He is taking Pravachol 40 mg daily for hyperlipidemia     Past Surgical History:   Procedure Laterality Date   • ABDOMINAL SURGERY     • CHOLECYSTECTOMY     • COLONOSCOPY  2014   • ENDOSCOPY  2014   • LUNG SURGERY      biopsy   • MOUTH SURGERY      dental extractions   • SKIN BIOPSY     • TOTAL LAPAROSCOPIC HYSTERECTOMY N/A 1/16/2018    Procedure: ROBOTIC TOTAL LAPAROSCOPIC HYSTERECTOMY WITH BILATERAL SALPINGO OOPHORECTOMY;  Surgeon: Germain Topete MD;  Location: [facility];  Service:      Family History   Problem Relation Age of Onset   • Coronary artery disease Mother    • Heart attack Mother    • Diabetes Mother    • Heart disease Other    • Heart disease Father    • Hypertension Father    • Cancer Father    • No Known Problems Sister    • Diabetes Brother    • No Known Problems Sister    • No Known Problems Sister    • No Known Problems Sister    • Arthritis Brother      Past Medical History:   Diagnosis Date   • Ankle edema    • Anxiety and depression    • Arthritis    • Asthmatic bronchitis    • Atypical chest pain    • COPD (chronic obstructive pulmonary disease)    • Family history of premature  coronary artery disease    • GERD (gastroesophageal reflux disease)    • Hiatal hernia    • History of EKG 09/29/2015    NORMAL   • Hyperlipidemia    • Hypertension    • Lung nodules    • Osteoarthritis    • Ovarian mass, left    • Ovarian tumor (benign), left 1/16/2018   • Recent weight loss    • Seasonal allergies    • Tobacco abuse    • Wears dentures     top plate     Patient Active Problem List   Diagnosis   • Hyperlipidemia   • GERD (gastroesophageal reflux disease)   • Tobacco use   • Obesity   • COPD (chronic obstructive pulmonary disease)   • Atypical chest pain   • Palpitations   • Ovarian tumor (benign), left       Social History   Substance Use Topics   • Smoking status: Current Every Day Smoker     Packs/day: 1.00     Years: 37.00     Types: Cigarettes   • Smokeless tobacco: Never Used   • Alcohol use No       No Known Allergies    Current Outpatient Prescriptions on File Prior to Visit   Medication Sig   • albuterol (PROVENTIL HFA;VENTOLIN HFA) 108 (90 BASE) MCG/ACT inhaler Inhale 2 puffs Every 6 (Six) Hours As Needed for Wheezing or Shortness of Air.   • alendronate (FOSAMAX) 70 MG tablet Take 1 tablet by mouth 1 (One) Time Per Week. Prior to [facility] Admission, Patient was on: takes on mondays   • aspirin 81 MG tablet Take 1 tablet by mouth Every Night. Prior to [facility] Admission, Patient was on: been on hold since 01/06/18 for surgery   • diazepam (VALIUM) 5 MG tablet Take 5 mg by mouth 3 (three) times a day.   • diclofenac (VOLTAREN) 1 % gel gel Apply 4 g topically 3 (Three) Times a Day As Needed (applies to shoulders and hip).   • escitalopram (LEXAPRO) 20 MG tablet Take 20 mg by mouth Daily.   • estradiol (ESTRACE) 2 MG tablet Take 1 tablet by mouth Daily.   • fluticasone-salmeterol (ADVAIR) 500-50 MCG/DOSE DISKUS Inhale 1 puff 2 (Two) Times a Day.   • furosemide (LASIX) 20 MG tablet Take 20 mg by mouth Daily.   • gabapentin (NEURONTIN) 600 MG tablet Take 600 mg by mouth 3 (Three) Times a Day.   •  "HYDROcodone-acetaminophen (NORCO)  MG per tablet Take 1 tablet by mouth 3 (three) times a day.   • hydrOXYzine (VISTARIL) 25 MG capsule Take 25 mg by mouth 2 (Two) Times a Day.   • loratadine (CLARITIN) 10 MG tablet Take 10 mg by mouth Daily.   • meloxicam (MOBIC) 15 MG tablet Take 15 mg by mouth daily.   • metoclopramide (REGLAN) 10 MG tablet Take 10 mg by mouth Daily.   • montelukast (SINGULAIR) 10 MG tablet Take 10 mg by mouth every night.   • pantoprazole (PROTONIX) 40 MG EC tablet Take 40 mg by mouth 2 (Two) Times a Day.   • pravastatin (PRAVACHOL) 40 MG tablet Take 40 mg by mouth Every Night.   • sertraline (ZOLOFT) 25 MG tablet Take 25 mg by mouth Daily.   • vitamin D (ERGOCALCIFEROL) 39135 units capsule capsule Take 50,000 Units by mouth Every 30 (Thirty) Days.   • [DISCONTINUED] vitamin B-12 (CYANOCOBALAMIN) 1000 MCG tablet Take 1,000 mcg by mouth daily.   • [DISCONTINUED] oxyCODONE-acetaminophen (PERCOCET) 5-325 MG per tablet Take 1 tablet by mouth Every 4 (Four) Hours As Needed (pain).     No current facility-administered medications on file prior to visit.          The following portions of the patient's history were reviewed and updated as appropriate: allergies, current medications, past family history, past medical history, past social history, past surgical history and problem list.    Review of Systems   Respiratory: Positive for shortness of breath and wheezing.    Musculoskeletal: Positive for arthritis and myalgias.   Gastrointestinal: Positive for heartburn.          Objective:     /64 (BP Location: Left arm, Patient Position: Sitting, Cuff Size: Adult)   Pulse 82   Resp 18   Ht 165.1 cm (65\")   Wt 65.7 kg (144 lb 12.8 oz)   LMP  (LMP Unknown)   SpO2 98%   BMI 24.10 kg/m²   Physical Exam   Constitutional: She appears well-developed and well-nourished. No distress.   HENT:   Head: Normocephalic and atraumatic.   Mouth/Throat: Oropharynx is clear and moist. No oropharyngeal " exudate.   Eyes: Conjunctivae and EOM are normal. Pupils are equal, round, and reactive to light. No scleral icterus.   Neck: Normal range of motion. Neck supple. No JVD present. No tracheal deviation present. No thyromegaly present.   Cardiovascular: Normal rate, regular rhythm, normal heart sounds and intact distal pulses.  PMI is not displaced.  Exam reveals no gallop, no friction rub and no decreased pulses.    No murmur heard.  Pulses:       Carotid pulses are 3+ on the right side, and 3+ on the left side.       Radial pulses are 3+ on the right side, and 3+ on the left side.   Pulmonary/Chest: Effort normal and breath sounds normal. No respiratory distress. She has no wheezes. She has no rales. She exhibits no tenderness.   Abdominal: Soft. Bowel sounds are normal. She exhibits no distension, no abdominal bruit and no mass. There is no splenomegaly or hepatomegaly. There is no tenderness. There is no rebound and no guarding.   Musculoskeletal: Normal range of motion. She exhibits no edema, tenderness or deformity.   Lymphadenopathy:     She has no cervical adenopathy.   Neurological: She is alert. She has normal reflexes. No cranial nerve deficit. She exhibits normal muscle tone. Coordination normal.   Skin: Skin is warm and dry. No rash noted. She is not diaphoretic. No erythema.   Psychiatric: She has a normal mood and affect. Her behavior is normal. Judgment and thought content normal.         Lab Review  Lab Results   Component Value Date     01/11/2018    K 4.1 01/11/2018     01/11/2018    BUN 6 (L) 01/11/2018    CREATININE 0.79 01/11/2018    GLUCOSE 80 01/11/2018    CALCIUM 9.0 01/11/2018    ALT 24 02/20/2014    ALKPHOS 68 02/20/2014    LABIL2 1.6 02/20/2014     No results found for: CKTOTAL  Lab Results   Component Value Date    WBC 15.79 (H) 01/17/2018    HGB 12.4 01/17/2018    HCT 39.1 01/17/2018     01/17/2018       Procedures       I personally viewed and interpreted the patient's  LAB data         Assessment:     1. Palpitations    2. Mixed hyperlipidemia    3. Atypical chest pain    4. Tobacco use          Plan:      Palpitations are better she has had no palpitations since last visit.  Chest pain only occurs when she is anxious.  On physical exertion there is no problem.  Healthy lifestyle was emphasized.  Patient still using tobacco cessation of tobacco was strongly urged.    She is taking Pravachol and plan to have lab work next visit with her PCP.  6 months follow-up scheduled        Return in about 6 months (around 11/21/2018).

## 2018-12-14 ENCOUNTER — OFFICE VISIT (OUTPATIENT)
Dept: CARDIOLOGY | Facility: CLINIC | Age: 58
End: 2018-12-14

## 2018-12-14 VITALS
BODY MASS INDEX: 25.49 KG/M2 | HEART RATE: 87 BPM | HEIGHT: 65 IN | OXYGEN SATURATION: 98 % | DIASTOLIC BLOOD PRESSURE: 75 MMHG | SYSTOLIC BLOOD PRESSURE: 124 MMHG | WEIGHT: 153 LBS

## 2018-12-14 DIAGNOSIS — R00.2 PALPITATIONS: ICD-10-CM

## 2018-12-14 DIAGNOSIS — E78.2 MIXED HYPERLIPIDEMIA: Primary | ICD-10-CM

## 2018-12-14 DIAGNOSIS — Z72.0 TOBACCO USE: ICD-10-CM

## 2018-12-14 PROCEDURE — 93000 ELECTROCARDIOGRAM COMPLETE: CPT | Performed by: INTERNAL MEDICINE

## 2018-12-14 PROCEDURE — 99213 OFFICE O/P EST LOW 20 MIN: CPT | Performed by: INTERNAL MEDICINE

## 2018-12-14 RX ORDER — FOLIC ACID 1 MG/1
1 TABLET ORAL DAILY
Refills: 1 | COMMUNITY
Start: 2018-12-11 | End: 2022-01-13 | Stop reason: ALTCHOICE

## 2018-12-14 RX ORDER — CETIRIZINE HYDROCHLORIDE 10 MG/1
10 TABLET ORAL DAILY
Refills: 2 | COMMUNITY
Start: 2018-11-26 | End: 2020-06-09 | Stop reason: ALTCHOICE

## 2018-12-14 RX ORDER — AMOXICILLIN 875 MG/1
875 TABLET, COATED ORAL 2 TIMES DAILY
Refills: 0 | COMMUNITY
Start: 2018-12-10 | End: 2019-06-26 | Stop reason: ALTCHOICE

## 2018-12-14 NOTE — PROGRESS NOTES
subjective     Chief Complaint   Patient presents with   • Palpitations   • Follow-up     EKG     History of Present Illness  Patient is 58 years old white female who is doing very well.  Patient was initially seen by me because of atypical chest pain cardiac workup was negative.  Lately she is not having any problems denies any palpitations chest pain or shortness of breath.  Patient recently had appendectomy and is doing very well.  Patient has a hyperlipidemia which is very well controlled with Pravachol 40 mg daily  Patient takes baby aspirin daily and Fosamax.  She also has COPD on Proventil HFA and Advair.    Past Surgical History:   Procedure Laterality Date   • ABDOMINAL SURGERY     • APPENDECTOMY     • CHOLECYSTECTOMY     • COLONOSCOPY  2014   • ENDOSCOPY  2014   • LUNG SURGERY      biopsy   • MOUTH SURGERY      dental extractions   • SKIN BIOPSY       Family History   Problem Relation Age of Onset   • Coronary artery disease Mother    • Heart attack Mother    • Diabetes Mother    • Heart disease Other    • Heart disease Father    • Hypertension Father    • Cancer Father    • No Known Problems Sister    • Diabetes Brother    • No Known Problems Sister    • No Known Problems Sister    • No Known Problems Sister    • Arthritis Brother      Past Medical History:   Diagnosis Date   • Ankle edema    • Anxiety and depression    • Arthritis    • Asthmatic bronchitis    • Atypical chest pain    • COPD (chronic obstructive pulmonary disease) (CMS/HCC)    • Family history of premature coronary artery disease    • GERD (gastroesophageal reflux disease)    • Hiatal hernia    • History of EKG 09/29/2015    NORMAL   • Hyperlipidemia    • Hypertension    • Lung nodules    • Osteoarthritis    • Ovarian mass, left    • Ovarian tumor (benign), left 1/16/2018   • Recent weight loss    • Seasonal allergies    • Tobacco abuse    • Wears dentures     top plate     Patient Active Problem List   Diagnosis   • Hyperlipidemia   •  GERD (gastroesophageal reflux disease)   • Tobacco use   • Obesity   • COPD (chronic obstructive pulmonary disease) (CMS/[facility])   • Atypical chest pain   • Palpitations   • Ovarian tumor (benign), left       Social History     Tobacco Use   • Smoking status: Current Every Day Smoker     Packs/day: 1.00     Years: 37.00     Pack years: 37.00     Types: Cigarettes   • Smokeless tobacco: Never Used   Substance Use Topics   • Alcohol use: No   • Drug use: No       No Known Allergies    Current Outpatient Medications on File Prior to Visit   Medication Sig   • albuterol (PROVENTIL HFA;VENTOLIN HFA) 108 (90 BASE) MCG/ACT inhaler Inhale 2 puffs Every 6 (Six) Hours As Needed for Wheezing or Shortness of Air.   • alendronate (FOSAMAX) 70 MG tablet Take 1 tablet by mouth 1 (One) Time Per Week. Prior to [facility] Admission, Patient was on: takes on mondays   • amoxicillin (AMOXIL) 875 MG tablet Take 875 mg by mouth 2 (Two) Times a Day.   • aspirin 81 MG tablet Take 1 tablet by mouth Every Night. Prior to [facility] Admission, Patient was on: been on hold since 01/06/18 for surgery   • cetirizine (zyrTEC) 10 MG tablet Take 10 mg by mouth Daily.   • diazepam (VALIUM) 5 MG tablet Take 5 mg by mouth 3 (three) times a day.   • diclofenac (VOLTAREN) 1 % gel gel Apply 4 g topically 3 (Three) Times a Day As Needed (applies to shoulders and hip).   • escitalopram (LEXAPRO) 20 MG tablet Take 20 mg by mouth Daily.   • estradiol (ESTRACE) 2 MG tablet Take 1 tablet by mouth Daily.   • fluticasone (FLONASE) 50 MCG/ACT nasal spray into each nostril.   • fluticasone-salmeterol (ADVAIR) 500-50 MCG/DOSE DISKUS Inhale 1 puff 2 (Two) Times a Day.   • folic acid (FOLVITE) 1 MG tablet Take 1 mg by mouth Daily.   • furosemide (LASIX) 20 MG tablet Take 20 mg by mouth Daily.   • gabapentin (NEURONTIN) 600 MG tablet Take 600 mg by mouth 3 (Three) Times a Day.   • HYDROcodone-acetaminophen (NORCO)  MG per tablet Take 1 tablet by mouth 3 (three) times a  "day.   • hydrOXYzine (VISTARIL) 25 MG capsule Take 25 mg by mouth 2 (Two) Times a Day.   • loratadine (CLARITIN) 10 MG tablet Take 10 mg by mouth Daily.   • meloxicam (MOBIC) 15 MG tablet Take 15 mg by mouth daily.   • metoclopramide (REGLAN) 10 MG tablet Take 10 mg by mouth Daily.   • montelukast (SINGULAIR) 10 MG tablet Take 10 mg by mouth every night.   • pantoprazole (PROTONIX) 40 MG EC tablet Take 40 mg by mouth 2 (Two) Times a Day.   • pravastatin (PRAVACHOL) 40 MG tablet Take 40 mg by mouth Every Night.   • sertraline (ZOLOFT) 25 MG tablet Take 25 mg by mouth Daily.   • vitamin D (ERGOCALCIFEROL) 98941 units capsule capsule Take 50,000 Units by mouth Every 30 (Thirty) Days.     No current facility-administered medications on file prior to visit.          The following portions of the patient's history were reviewed and updated as appropriate: allergies, current medications, past family history, past medical history, past social history, past surgical history and problem list.    Review of Systems   Constitution: Negative.   HENT: Negative.  Negative for congestion.    Eyes: Negative.    Cardiovascular: Negative.  Negative for chest pain, cyanosis, dyspnea on exertion, irregular heartbeat, leg swelling, near-syncope, orthopnea, palpitations, paroxysmal nocturnal dyspnea and syncope.   Respiratory: Negative.  Negative for shortness of breath.    Hematologic/Lymphatic: Negative.    Musculoskeletal: Negative.    Gastrointestinal: Negative.    Neurological: Negative.  Negative for headaches.          Objective:     /75 (BP Location: Left arm, Patient Position: Sitting)   Pulse 87   Ht 165.1 cm (65\")   Wt 69.4 kg (153 lb)   LMP  (LMP Unknown)   SpO2 98%   BMI 25.46 kg/m²   Physical Exam   Constitutional: She appears well-developed and well-nourished. No distress.   HENT:   Head: Normocephalic and atraumatic.   Mouth/Throat: Oropharynx is clear and moist. No oropharyngeal exudate.   Eyes: Conjunctivae " and EOM are normal. Pupils are equal, round, and reactive to light. No scleral icterus.   Neck: Normal range of motion. Neck supple. No JVD present. No tracheal deviation present. No thyromegaly present.   Cardiovascular: Normal rate, regular rhythm, normal heart sounds and intact distal pulses. PMI is not displaced. Exam reveals no gallop, no friction rub and no decreased pulses.   No murmur heard.  Pulses:       Carotid pulses are 3+ on the right side, and 3+ on the left side.       Radial pulses are 3+ on the right side, and 3+ on the left side.   Pulmonary/Chest: Effort normal and breath sounds normal. No respiratory distress. She has no wheezes. She has no rales. She exhibits no tenderness.   Abdominal: Soft. Bowel sounds are normal. She exhibits no distension, no abdominal bruit and no mass. There is no splenomegaly or hepatomegaly. There is no tenderness. There is no rebound and no guarding.   Musculoskeletal: Normal range of motion. She exhibits no edema, tenderness or deformity.   Lymphadenopathy:     She has no cervical adenopathy.   Neurological: She is alert. She has normal reflexes. No cranial nerve deficit. She exhibits normal muscle tone. Coordination normal.   Skin: Skin is warm and dry. No rash noted. She is not diaphoretic. No erythema.   Psychiatric: She has a normal mood and affect. Her behavior is normal. Judgment and thought content normal.         Lab Review  No recent lab work available    ECG 12 Lead  Date/Time: 12/14/2018 4:11 PM  Performed by: Zia Coelho MD  Authorized by: Zia Coelho MD   Comparison: compared with previous ECG from 11/20/2017  Similar to previous ECG  Rhythm: sinus rhythm  Rate: normal  BPM: 75  Conduction: conduction normal  ST Segments: ST segments normal  T Waves: T waves normal  QRS axis: normal  Other: no other findings  Clinical impression: normal ECG               I personally viewed and interpreted the patient's LAB data          Assessment:     1. Mixed hyperlipidemia    2. Tobacco use    3. Palpitations          Plan:      Patient is doing very well from cardiac standpoint.  No change in therapy.  She was advised to continue baby aspirin daily.  Hyperlipidemia is being treated by her PCP with the Pravachol she will continue statin therapy.  Healthy lifestyle was emphasized.  She needs to discontinue tobacco use.    Cardiac follow-up in 6 months    Return in about 6 months (around 6/14/2019).

## 2019-05-29 ENCOUNTER — OFFICE VISIT (OUTPATIENT)
Dept: PULMONOLOGY | Facility: CLINIC | Age: 59
End: 2019-05-29

## 2019-05-29 VITALS
SYSTOLIC BLOOD PRESSURE: 117 MMHG | DIASTOLIC BLOOD PRESSURE: 79 MMHG | OXYGEN SATURATION: 95 % | BODY MASS INDEX: 26.29 KG/M2 | WEIGHT: 154 LBS | TEMPERATURE: 98 F | HEART RATE: 77 BPM | HEIGHT: 64 IN

## 2019-05-29 DIAGNOSIS — R06.02 SOB (SHORTNESS OF BREATH) ON EXERTION: ICD-10-CM

## 2019-05-29 DIAGNOSIS — F17.200 CURRENT SMOKER: ICD-10-CM

## 2019-05-29 DIAGNOSIS — G47.33 OSA (OBSTRUCTIVE SLEEP APNEA): ICD-10-CM

## 2019-05-29 DIAGNOSIS — J43.2 CENTRILOBULAR EMPHYSEMA (HCC): ICD-10-CM

## 2019-05-29 DIAGNOSIS — R91.8 PULMONARY NODULES: Primary | ICD-10-CM

## 2019-05-29 DIAGNOSIS — J30.1 SEASONAL ALLERGIC RHINITIS DUE TO POLLEN: ICD-10-CM

## 2019-05-29 PROCEDURE — 99204 OFFICE O/P NEW MOD 45 MIN: CPT | Performed by: INTERNAL MEDICINE

## 2019-05-29 PROCEDURE — 94618 PULMONARY STRESS TESTING: CPT | Performed by: INTERNAL MEDICINE

## 2019-05-29 PROCEDURE — 99407 BEHAV CHNG SMOKING > 10 MIN: CPT | Performed by: INTERNAL MEDICINE

## 2019-05-29 PROCEDURE — 94664 DEMO&/EVAL PT USE INHALER: CPT | Performed by: INTERNAL MEDICINE

## 2019-05-29 RX ORDER — MULTIVIT WITH MINERALS/LUTEIN
250 TABLET ORAL DAILY
COMMUNITY
End: 2022-01-13 | Stop reason: ALTCHOICE

## 2019-06-26 ENCOUNTER — OFFICE VISIT (OUTPATIENT)
Dept: CARDIOLOGY | Facility: CLINIC | Age: 59
End: 2019-06-26

## 2019-06-26 VITALS
WEIGHT: 154 LBS | DIASTOLIC BLOOD PRESSURE: 76 MMHG | OXYGEN SATURATION: 98 % | HEIGHT: 64 IN | SYSTOLIC BLOOD PRESSURE: 130 MMHG | BODY MASS INDEX: 26.29 KG/M2 | HEART RATE: 77 BPM

## 2019-06-26 DIAGNOSIS — E78.2 MIXED HYPERLIPIDEMIA: ICD-10-CM

## 2019-06-26 DIAGNOSIS — R00.2 PALPITATIONS: Primary | ICD-10-CM

## 2019-06-26 DIAGNOSIS — Z72.0 TOBACCO USE: ICD-10-CM

## 2019-06-26 PROCEDURE — 99213 OFFICE O/P EST LOW 20 MIN: CPT | Performed by: INTERNAL MEDICINE

## 2019-06-26 RX ORDER — HYDROXYCHLOROQUINE SULFATE 200 MG/1
TABLET, FILM COATED ORAL
Refills: 11 | COMMUNITY
Start: 2019-06-03 | End: 2022-01-13 | Stop reason: ALTCHOICE

## 2019-06-26 RX ORDER — ZOLPIDEM TARTRATE 5 MG/1
TABLET ORAL
Refills: 0 | COMMUNITY
Start: 2019-06-06 | End: 2022-01-13 | Stop reason: ALTCHOICE

## 2019-06-26 RX ORDER — LACTULOSE 10 G/15ML
SOLUTION ORAL
Refills: 1 | COMMUNITY
Start: 2019-06-07

## 2019-06-26 RX ORDER — LEVOCETIRIZINE DIHYDROCHLORIDE 5 MG/1
TABLET, FILM COATED ORAL
Refills: 1 | COMMUNITY
Start: 2019-05-09 | End: 2022-01-13 | Stop reason: ALTCHOICE

## 2019-06-26 NOTE — PROGRESS NOTES
subjective     Chief Complaint   Patient presents with   • Hyperlipidemia     History of Present Illness  Alanna is 59 years old white female who is here for follow-up.  Patient has COPD and ongoing tobacco use.  He also has hyperlipidemia and is taking Pravachol 40 mg daily  He is following closely with her PCP Dr. Miller.  No lab work is available at this time but from cardiac standpoint she should be doing very well.  Palpitations are better and she has no chest  She is primarily worried about her recent mammogram and ultrasound of the breast.  She plans to have biopsy no new complaints    Past Surgical History:   Procedure Laterality Date   • ABDOMINAL SURGERY     • APPENDECTOMY     • CHOLECYSTECTOMY     • COLONOSCOPY  2014   • ENDOSCOPY  2014   • LUNG SURGERY      biopsy   • MOUTH SURGERY      dental extractions   • SKIN BIOPSY     • TOTAL LAPAROSCOPIC HYSTERECTOMY N/A 1/16/2018    Procedure: ROBOTIC TOTAL LAPAROSCOPIC HYSTERECTOMY WITH BILATERAL SALPINGO OOPHORECTOMY;  Surgeon: Germain Topete MD;  Location: [facility];  Service:      Family History   Problem Relation Age of Onset   • Coronary artery disease Mother    • Heart attack Mother    • Diabetes Mother    • Heart disease Other    • Heart disease Father    • Hypertension Father    • Cancer Father    • No Known Problems Sister    • Diabetes Brother    • No Known Problems Sister    • No Known Problems Sister    • No Known Problems Sister    • Arthritis Brother      Past Medical History:   Diagnosis Date   • Ankle edema    • Anxiety and depression    • Arthritis    • Asthmatic bronchitis    • Atypical chest pain    • COPD (chronic obstructive pulmonary disease) (CMS/HCC)    • Family history of premature coronary artery disease    • GERD (gastroesophageal reflux disease)    • Hiatal hernia    • History of EKG 09/29/2015    NORMAL   • Hyperlipidemia    • Hypertension    • Lung nodules    • Osteoarthritis    • Ovarian mass, left    • Ovarian tumor (benign),  left 1/16/2018   • Recent weight loss    • Seasonal allergies    • Tobacco abuse    • Wears dentures     top plate     Patient Active Problem List   Diagnosis   • Hyperlipidemia   • GERD (gastroesophageal reflux disease)   • Tobacco use   • Obesity   • COPD (chronic obstructive pulmonary disease) (CMS/[facility])   • Atypical chest pain   • Palpitations   • Ovarian tumor (benign), left       Social History     Tobacco Use   • Smoking status: Current Every Day Smoker     Packs/day: 1.00     Years: 37.00     Pack years: 37.00     Types: Cigarettes   • Smokeless tobacco: Never Used   Substance Use Topics   • Alcohol use: No   • Drug use: No       No Known Allergies    Current Outpatient Medications on File Prior to Visit   Medication Sig   • albuterol (PROVENTIL HFA;VENTOLIN HFA) 108 (90 BASE) MCG/ACT inhaler Inhale 2 puffs Every 6 (Six) Hours As Needed for Wheezing or Shortness of Air.   • alendronate (FOSAMAX) 70 MG tablet Take 1 tablet by mouth 1 (One) Time Per Week. Prior to [facility] Admission, Patient was on: takes on mondays   • aspirin 81 MG tablet Take 1 tablet by mouth Every Night. Prior to [facility] Admission, Patient was on: been on hold since 01/06/18 for surgery   • cetirizine (zyrTEC) 10 MG tablet Take 10 mg by mouth Daily.   • diazepam (VALIUM) 5 MG tablet Take 5 mg by mouth 3 (three) times a day.   • diclofenac (VOLTAREN) 1 % gel gel Apply 4 g topically 3 (Three) Times a Day As Needed (applies to shoulders and hip).   • escitalopram (LEXAPRO) 20 MG tablet Take 20 mg by mouth Daily.   • estradiol (ESTRACE) 2 MG tablet Take 1 tablet by mouth Daily.   • fluticasone (FLONASE) 50 MCG/ACT nasal spray into each nostril.   • fluticasone-salmeterol (ADVAIR) 500-50 MCG/DOSE DISKUS Inhale 1 puff 2 (Two) Times a Day.   • folic acid (FOLVITE) 1 MG tablet Take 1 mg by mouth Daily.   • furosemide (LASIX) 20 MG tablet Take 20 mg by mouth Daily.   • gabapentin (NEURONTIN) 600 MG tablet Take 600 mg by mouth 3 (Three) Times a Day.    • HYDROcodone-acetaminophen (NORCO)  MG per tablet Take 1 tablet by mouth 3 (three) times a day.   • hydroxychloroquine (PLAQUENIL) 200 MG tablet    • lactulose (CHRONULAC) 10 GM/15ML solution    • levocetirizine (XYZAL) 5 MG tablet    • loratadine (CLARITIN) 10 MG tablet Take 10 mg by mouth Daily.   • meloxicam (MOBIC) 15 MG tablet Take 15 mg by mouth daily.   • metoclopramide (REGLAN) 10 MG tablet Take 10 mg by mouth Daily.   • montelukast (SINGULAIR) 10 MG tablet Take 10 mg by mouth every night.   • pantoprazole (PROTONIX) 40 MG EC tablet Take 40 mg by mouth 2 (Two) Times a Day.   • pravastatin (PRAVACHOL) 40 MG tablet Take 40 mg by mouth Every Night.   • sertraline (ZOLOFT) 25 MG tablet Take 25 mg by mouth Daily.   • Umeclidinium Bromide (INCRUSE ELLIPTA) 62.5 MCG/INH aerosol powder  Inhale 1 puff Daily.   • vitamin C (ASCORBIC ACID) 250 MG tablet Take 250 mg by mouth Daily.   • vitamin D (ERGOCALCIFEROL) 82295 units capsule capsule Take 50,000 Units by mouth Every 30 (Thirty) Days.   • zolpidem (AMBIEN) 5 MG tablet    • [DISCONTINUED] amoxicillin (AMOXIL) 875 MG tablet Take 875 mg by mouth 2 (Two) Times a Day.   • [DISCONTINUED] hydrOXYzine (VISTARIL) 25 MG capsule Take 25 mg by mouth 2 (Two) Times a Day.     No current facility-administered medications on file prior to visit.          The following portions of the patient's history were reviewed and updated as appropriate: allergies, current medications, past family history, past medical history, past social history, past surgical history and problem list.    Review of Systems   Constitution: Negative.   HENT: Negative.  Negative for congestion.    Eyes: Negative.    Cardiovascular: Negative.  Negative for chest pain, cyanosis, dyspnea on exertion, irregular heartbeat, leg swelling, near-syncope, orthopnea, palpitations, paroxysmal nocturnal dyspnea and syncope.   Respiratory: Negative.  Negative for shortness of breath.    Hematologic/Lymphatic:  "Negative.    Musculoskeletal: Negative.    Gastrointestinal: Negative.    Neurological: Negative.  Negative for headaches.          Objective:     /76 (BP Location: Left arm, Patient Position: Sitting)   Pulse 77   Ht 162.6 cm (64\")   Wt 69.9 kg (154 lb)   LMP  (LMP Unknown)   SpO2 98%   BMI 26.43 kg/m²   Physical Exam   Constitutional: She appears well-developed and well-nourished. No distress.   HENT:   Head: Normocephalic and atraumatic.   Mouth/Throat: Oropharynx is clear and moist. No oropharyngeal exudate.   Eyes: Conjunctivae and EOM are normal. Pupils are equal, round, and reactive to light. No scleral icterus.   Neck: Normal range of motion. Neck supple. No JVD present. No tracheal deviation present. No thyromegaly present.   Cardiovascular: Normal rate, regular rhythm, normal heart sounds and intact distal pulses. PMI is not displaced. Exam reveals no gallop, no friction rub and no decreased pulses.   No murmur heard.  Pulses:       Carotid pulses are 3+ on the right side, and 3+ on the left side.       Radial pulses are 3+ on the right side, and 3+ on the left side.   Pulmonary/Chest: Effort normal and breath sounds normal. No respiratory distress. She has no wheezes. She has no rales. She exhibits no tenderness.   Abdominal: Soft. Bowel sounds are normal. She exhibits no distension, no abdominal bruit and no mass. There is no splenomegaly or hepatomegaly. There is no tenderness. There is no rebound and no guarding.   Musculoskeletal: Normal range of motion. She exhibits no edema, tenderness or deformity.   Lymphadenopathy:     She has no cervical adenopathy.   Neurological: She is alert. She has normal reflexes. No cranial nerve deficit. She exhibits normal muscle tone. Coordination normal.   Skin: Skin is warm and dry. No rash noted. She is not diaphoretic. No erythema.   Psychiatric: She has a normal mood and affect. Her behavior is normal. Judgment and thought content normal.         Lab " Review  Lab Results   Component Value Date     01/11/2018    K 4.1 01/11/2018     01/11/2018    BUN 6 (L) 01/11/2018    CREATININE 0.79 01/11/2018    GLUCOSE 80 01/11/2018    CALCIUM 9.0 01/11/2018    ALT 24 02/20/2014    ALKPHOS 68 02/20/2014    LABIL2 1.6 02/20/2014     No results found for: CKTOTAL  Lab Results   Component Value Date    WBC 15.79 (H) 01/17/2018    HGB 12.4 01/17/2018    HCT 39.1 01/17/2018     01/17/2018     No results found for: INR  No results found for: MG  No results found for: PSA, TSH  No results found for: BNP  Lab Results   Component Value Date    CHLPL 165 02/20/2014    TRIG 352 (H) 02/20/2014    HDL 32 (L) 02/20/2014    VLDL 70 (H) 02/20/2014     Lab Results   Component Value Date    LDL 62 02/20/2014       Procedures       I personally viewed and interpreted the patient's LAB data         Assessment:     1. Palpitations    2. Tobacco use    3. Mixed hyperlipidemia               Patient is doing very well blood pressure is nicely controlled.  No palpitations.  She   is tolerating medication for hyperlipidemia .  Cessation of smoking was stressed  Healthy lifestyle discussed  Follow-up scheduled          No Follow-up on file.

## 2019-07-02 ENCOUNTER — HOSPITAL ENCOUNTER (OUTPATIENT)
Dept: RESPIRATORY THERAPY | Facility: HOSPITAL | Age: 59
Discharge: HOME | End: 2019-07-02
Admitting: INTERNAL MEDICINE

## 2019-07-02 VITALS — RESPIRATION RATE: 16 BRPM | HEART RATE: 69 BPM | OXYGEN SATURATION: 96 %

## 2019-07-02 DIAGNOSIS — J43.2 CENTRILOBULAR EMPHYSEMA (HCC): ICD-10-CM

## 2019-07-02 DIAGNOSIS — R06.02 SOB (SHORTNESS OF BREATH) ON EXERTION: ICD-10-CM

## 2019-07-02 PROCEDURE — 94060 EVALUATION OF WHEEZING: CPT | Performed by: INTERNAL MEDICINE

## 2019-07-02 PROCEDURE — 94060 EVALUATION OF WHEEZING: CPT

## 2019-07-02 PROCEDURE — 94729 DIFFUSING CAPACITY: CPT

## 2019-07-02 PROCEDURE — 94640 AIRWAY INHALATION TREATMENT: CPT

## 2019-07-02 PROCEDURE — 94727 GAS DIL/WSHOT DETER LNG VOL: CPT

## 2019-07-02 PROCEDURE — 94727 GAS DIL/WSHOT DETER LNG VOL: CPT | Performed by: INTERNAL MEDICINE

## 2019-07-02 PROCEDURE — 94729 DIFFUSING CAPACITY: CPT | Performed by: INTERNAL MEDICINE

## 2019-07-02 PROCEDURE — 94799 UNLISTED PULMONARY SVC/PX: CPT

## 2019-07-02 RX ORDER — ALBUTEROL SULFATE 2.5 MG/3ML
2.5 SOLUTION RESPIRATORY (INHALATION) ONCE
Status: COMPLETED | OUTPATIENT
Start: 2019-07-02 | End: 2019-07-02

## 2019-07-02 RX ADMIN — ALBUTEROL SULFATE 2.5 MG: 2.5 SOLUTION RESPIRATORY (INHALATION) at 09:54

## 2019-11-05 ENCOUNTER — OFFICE VISIT (OUTPATIENT)
Dept: PULMONOLOGY | Facility: CLINIC | Age: 59
End: 2019-11-05

## 2019-11-05 VITALS
BODY MASS INDEX: 26.46 KG/M2 | SYSTOLIC BLOOD PRESSURE: 129 MMHG | WEIGHT: 155 LBS | OXYGEN SATURATION: 98 % | HEART RATE: 73 BPM | DIASTOLIC BLOOD PRESSURE: 72 MMHG | HEIGHT: 64 IN

## 2019-11-05 DIAGNOSIS — J44.9 CHRONIC OBSTRUCTIVE PULMONARY DISEASE, UNSPECIFIED COPD TYPE (HCC): Primary | ICD-10-CM

## 2019-11-05 DIAGNOSIS — R53.83 FATIGUE, UNSPECIFIED TYPE: ICD-10-CM

## 2019-11-05 DIAGNOSIS — R91.8 MULTIPLE PULMONARY NODULES: ICD-10-CM

## 2019-11-05 DIAGNOSIS — F17.210 CIGARETTE NICOTINE DEPENDENCE WITHOUT COMPLICATION: ICD-10-CM

## 2019-11-05 PROCEDURE — 99214 OFFICE O/P EST MOD 30 MIN: CPT | Performed by: PHYSICIAN ASSISTANT

## 2019-11-05 RX ORDER — MONTELUKAST SODIUM 10 MG/1
10 TABLET ORAL NIGHTLY
Qty: 30 TABLET | Refills: 8 | Status: SHIPPED | OUTPATIENT
Start: 2019-11-05

## 2019-11-05 RX ORDER — ALBUTEROL SULFATE 90 UG/1
2 AEROSOL, METERED RESPIRATORY (INHALATION) EVERY 4 HOURS PRN
Qty: 1 INHALER | Refills: 8 | Status: SHIPPED | OUTPATIENT
Start: 2019-11-05 | End: 2020-05-05 | Stop reason: SDUPTHER

## 2019-11-05 NOTE — PROGRESS NOTES
Interval history since last visit: Symptomatically stable    Recent hospitalizations: None    Investigations (imaging, PFT's, labs, sleep study, record requests, etc.)    Have you had the Influenza Vaccine? yes    Would you like to receive this Vaccine today? no    Have you had the Pneumonia Vaccine?  yes   Would you like to receive this Vaccine today? no    Subjective    Alanna Concepcion presents for the following PULMONARY NODULES      History of Present Illness       Patient presents for follow up today of pulmonary nodules, COPD, current smoking use, and allergic rhinitis.  She denies significant shortness of breath upon exertion at this time.  She admits to a small amount of phlegm production noted recently that is clear.  Otherwise does not typically have significant phlegm production.  She continues to use Advair as directed.  She also uses Incruse, but reports that she often forgets to use this inhaler.  She reports that she rarely requires albuterol rescue inhaler use.   She continues to smoke approximately 1 to 1.5 packs/day.  She has had a significant smoking history with almost 40-year use.  She reports that she has attempted cessation with patches before without success.  She was also previously prescribed Chantix, but did not start use after reading the possible side effects.   She was positive for sleep apnea upon testing but was never able to tolerate the machine. She does admit to significant fatigue, and is unsure when she last underwent thyroid function testing.     She reported that she is up-to-date on influenza and pneumonia vaccinations at this time.    Review of Systems   Respiratory: Positive for cough. Negative for chest tightness, shortness of breath and wheezing.    Cardiovascular: Negative for chest pain and palpitations.   Neurological: Negative for dizziness, facial asymmetry, light-headedness and headaches.       Active Problems:  Problem List Items Addressed This Visit     None             Past Medical History:  Past Medical History:   Diagnosis Date   • Ankle edema    • Anxiety and depression    • Arthritis    • Asthmatic bronchitis    • Atypical chest pain    • COPD (chronic obstructive pulmonary disease) (CMS/[facility])    • Family history of premature coronary artery disease    • GERD (gastroesophageal reflux disease)    • Hiatal hernia    • History of EKG 09/29/2015    NORMAL   • Hyperlipidemia    • Hypertension    • Lung nodules    • Osteoarthritis    • Ovarian mass, left    • Ovarian tumor (benign), left 1/16/2018   • Recent weight loss    • Seasonal allergies    • Tobacco abuse    • Wears dentures     top plate       Family History:  Family History   Problem Relation Age of Onset   • Coronary artery disease Mother    • Heart attack Mother    • Diabetes Mother    • Heart disease Other    • Heart disease Father    • Hypertension Father    • Cancer Father    • No Known Problems Sister    • Diabetes Brother    • No Known Problems Sister    • No Known Problems Sister    • No Known Problems Sister    • Arthritis Brother        Social History:  Social History     Tobacco Use   • Smoking status: Current Every Day Smoker     Packs/day: 1.00     Years: 37.00     Pack years: 37.00     Types: Cigarettes   • Smokeless tobacco: Never Used   Substance Use Topics   • Alcohol use: No       Current Medications:  Current Outpatient Medications   Medication Sig Dispense Refill   • albuterol (PROVENTIL HFA;VENTOLIN HFA) 108 (90 BASE) MCG/ACT inhaler Inhale 2 puffs Every 6 (Six) Hours As Needed for Wheezing or Shortness of Air.     • alendronate (FOSAMAX) 70 MG tablet Take 1 tablet by mouth 1 (One) Time Per Week. Prior to [facility] [place], operated by [facility], Patient was on: takes on mondays     • aspirin 81 MG tablet Take 1 tablet by mouth Every Night. Prior to [facility] [place], operated by [facility], Patient was on: been on hold since 01/06/18 for surgery     • cetirizine (zyrTEC) 10 MG tablet Take 10 mg by mouth Daily.  2   • diazepam (VALIUM) 5 MG  tablet Take 5 mg by mouth 3 (three) times a day.     • diclofenac (VOLTAREN) 1 % gel gel Apply 4 g topically 3 (Three) Times a Day As Needed (applies to shoulders and hip).     • escitalopram (LEXAPRO) 20 MG tablet Take 20 mg by mouth Daily.     • estradiol (ESTRACE) 2 MG tablet Take 1 tablet by mouth Daily. 100 tablet 3   • fluticasone (FLONASE) 50 MCG/ACT nasal spray into each nostril.     • fluticasone-salmeterol (ADVAIR) 500-50 MCG/DOSE DISKUS Inhale 1 puff 2 (Two) Times a Day.     • folic acid (FOLVITE) 1 MG tablet Take 1 mg by mouth Daily.  1   • furosemide (LASIX) 20 MG tablet Take 20 mg by mouth Daily.     • gabapentin (NEURONTIN) 600 MG tablet Take 600 mg by mouth 3 (Three) Times a Day.     • HYDROcodone-acetaminophen (NORCO)  MG per tablet Take 1 tablet by mouth 3 (three) times a day.     • hydroxychloroquine (PLAQUENIL) 200 MG tablet   11   • lactulose (CHRONULAC) 10 GM/15ML solution   1   • levocetirizine (XYZAL) 5 MG tablet   1   • loratadine (CLARITIN) 10 MG tablet Take 10 mg by mouth Daily.     • meloxicam (MOBIC) 15 MG tablet Take 15 mg by mouth daily.     • metoclopramide (REGLAN) 10 MG tablet Take 10 mg by mouth Daily.     • montelukast (SINGULAIR) 10 MG tablet Take 10 mg by mouth every night.     • pantoprazole (PROTONIX) 40 MG EC tablet Take 40 mg by mouth 2 (Two) Times a Day.     • pravastatin (PRAVACHOL) 40 MG tablet Take 40 mg by mouth Every Night.     • sertraline (ZOLOFT) 25 MG tablet Take 25 mg by mouth Daily.     • Umeclidinium Bromide (INCRUSE ELLIPTA) 62.5 MCG/INH aerosol powder  Inhale 1 puff Daily. 1 each 6   • vitamin C (ASCORBIC ACID) 250 MG tablet Take 250 mg by mouth Daily.     • vitamin D (ERGOCALCIFEROL) 28602 units capsule capsule Take 50,000 Units by mouth Every 30 (Thirty) Days.     • zolpidem (AMBIEN) 5 MG tablet   0     No current facility-administered medications for this visit.        Allergies:  No Known Allergies    Vitals:  /72   Pulse 73   Ht 162.6 cm  "(64\")   Wt 70.3 kg (155 lb)   LMP  (LMP Unknown)   SpO2 98%   BMI 26.61 kg/m²     Imaging:    Imaging Results (Most Recent)     None          Pulmonary Functions Testing Results:    No results found for: FEV1, FVC, OMJ6DMN, TLC, DLCO    Results for orders placed or performed during the hospital encounter of 01/16/18   CBC (No Diff)   Result Value Ref Range    WBC 15.79 (H) 4.50 - 12.50 10*3/mm3    RBC 4.17 (L) 4.20 - 5.40 10*6/mm3    Hemoglobin 12.4 12.0 - 16.0 g/dL    Hematocrit 39.1 37.0 - 47.0 %    MCV 93.8 80.0 - 94.0 fL    MCH 29.7 27.0 - 33.0 pg    MCHC 31.7 (L) 33.0 - 37.0 g/dL    RDW 14.3 11.5 - 14.5 %    RDW-SD 47.0 37.0 - 54.0 fl    MPV 11.4 (H) 6.0 - 10.0 fL    Platelets 199 130 - 400 10*3/mm3   Tissue Pathology Exam - Tissue, Uterus with Cervix, Bilateral Tubes and Ovaries   Result Value Ref Range    Case Report       Surgical Pathology Report                         Case: [id number]                                  Authorizing Provider:  Germain Topete MD       Collected:           01/16/2018 10:37 AM          Ordering Location:     [facility]      Received:            01/16/2018 01:08 PM                                 OPERATING ROOM DEPARTMENT                                                    Pathologist:           Chuyita Naranjo MD                                                        Specimen:    Uterus with Cervix, Bilateral Tubes and Ovaries                                            Final Diagnosis       See Scanned Report        Embedded Images         Objective   Physical Exam   Constitutional: She is oriented to person, place, and time. She appears well-developed and well-nourished. No distress.   HENT:   Head: Normocephalic and atraumatic.   Nose: Nose normal.   Eyes: EOM are normal. Pupils are equal, round, and reactive to light.   Neck: Normal range of motion. No thyromegaly present.   Cardiovascular: Normal rate, regular rhythm, S1 normal, S2 normal and normal heart " sounds.   No murmur heard.  Pulmonary/Chest: Effort normal.   Bilateral air entry positive and appreciated throughout.  No wheezing, rhonchi, or crackles.   Musculoskeletal: Normal range of motion. She exhibits no edema.   Neurological: She is alert and oriented to person, place, and time.   Skin: Skin is warm and dry. She is not diaphoretic.   Psychiatric: She has a normal mood and affect. Her behavior is normal.   Vitals reviewed.      Assessment/Plan     I have reviewed the past medical history, family history, social history, surgical history, and allergies.     I have reviewed the provided CT chest without contrast report completed in March 2019.  File will be scanned into the chart.  Reported 3 pulmonary nodules with 2 of the right side and 1 of the left side.  Also notable for scarring changes from previous pulmonary nodule removal.  Patient confirmed that the procedure was performed in 2014.    I reviewed the PFT completed on 7/2/2019.  This showed no obstruction was significant for air trapping.  No significant bronchodilator response noted.  Test interpretation was limited by expiration which only lasted for 30 seconds.    I reviewed the echocardiogram completed on 11/17/2017.  EF was estimated to 60%.  No other significant valvular findings or pulmonary hypertension mention.  No pericardial effusion.        ICD-10-CM ICD-9-CM   1. Chronic obstructive pulmonary disease, unspecified COPD type (CMS/[facility]) J44.9 496   2. Fatigue, unspecified type R53.83 780.79   3. Multiple pulmonary nodules R91.8 793.19   4. Cigarette nicotine dependence without complication F17.210 305.1         COPD:  · Ordered for Trelegy Ellipta to improve patient compliance with inhalers.    She was previously compliant with Advair 500 but often forgot to use Incruse.    She was familiar with inhaler technique.    Samples were provided.  Will cancel order for incruse/advair after she informs us if Trelegy is well tolerated.   · Continue  Singulair 10 mg at a time.  Ordered refills  · Continue albuterol inhaler 2 puffs every 4 hours as needed.  Ordered refills  · Reviewed PFT  · Reviewed CT imaging      Fatigue:  · She is noncompliant with sleep apnea machine and does not wish to retry use again as she is unable to tolerate the machine previously.  · Ordered for thyroid function testing with TSH and free T4.      Multiple pulmonary nodules:  As we were not able to obtain the last CT report from from hospital, she brought a copy of her most recent CT imaging.  This will be scanned into the chart.  Upon review and with comparison of the available CT imaging from , it appears that 2 pulmonary nodules are possibly stable.  However we are unable to truly confirm this as we are not able to review the imaging.    From the report, she appears notable for a 3rd new onset nodule of the right lung.  She is a current smoker and reported that a family member recently  from lung cancer.  She has also previously had a nodule removed for concern of malignancy.   · As the previous scan appears to be completed in 2019, I have ordered for a repeat CT scan without contrast for  follow-up of multiple pulmonary nodules measuring greater than 5 mm, with concern for new nodule   · Based on review of size changes and appearance, she may be recommended for bronchoscopy and biopsy, or further imaging follow-up.        Smoking:     Smoking cessation counseling:  Ms. Concepcion is a current cigarette user. Notable for 40 year use with approximately 1-1.5 pack/day use. Currently using 1-1.5 packs per day.     We discussed consequences of smoking namely cardiovascular/metabolic, lung cancer, mouth cancer, pulmonary disorder including COPD and reduced quality of life.  Patient is understanding of the risks involved in smoking.  Patient also understands the benefits of quitting, including slowed progression of COPD.     Patient expresses that she is not willing to quit at  this time.   He has previously been unsuccessful with use of patches.  Not willing to try Chantix due to possible side effects.  Counseling was awaited and discussed that we could resume efforts if she was willing to quit at any time.        Vaccinations: reported up to date.     Patient's Body mass index is 26.61 kg/m². BMI is within normal parameters. No follow-up required..      Return in about 6 months (around 5/5/2020), or as needed.

## 2019-11-27 ENCOUNTER — TELEPHONE (OUTPATIENT)
Dept: PULMONOLOGY | Facility: CLINIC | Age: 59
End: 2019-11-27

## 2019-11-27 ENCOUNTER — HOSPITAL ENCOUNTER (OUTPATIENT)
Dept: CT IMAGING | Facility: HOSPITAL | Age: 59
Discharge: HOME | End: 2019-11-27
Admitting: PHYSICIAN ASSISTANT

## 2019-11-27 DIAGNOSIS — J44.9 CHRONIC OBSTRUCTIVE PULMONARY DISEASE, UNSPECIFIED COPD TYPE (HCC): ICD-10-CM

## 2019-11-27 DIAGNOSIS — R91.8 MULTIPLE PULMONARY NODULES: ICD-10-CM

## 2019-11-27 DIAGNOSIS — R91.8 MULTIPLE PULMONARY NODULES: Primary | ICD-10-CM

## 2019-11-27 DIAGNOSIS — F17.210 CIGARETTE NICOTINE DEPENDENCE WITHOUT COMPLICATION: ICD-10-CM

## 2019-11-27 PROCEDURE — 71250 CT THORAX DX C-: CPT | Performed by: RADIOLOGY

## 2019-11-27 PROCEDURE — 71250 CT THORAX DX C-: CPT

## 2019-11-27 NOTE — TELEPHONE ENCOUNTER
Called Mrs. Concepcion to review CT scan results. Nodules have appeared stable in size upon imaging comparison over 5 years (since 2014, imaging is available in our computer system).   Per my interpretation, there was also a nodule in the right upper lobe that also appeared stable and measured approximately 5 mm in width upon both imaging without significant with similar appearance.  This nodule was not mentioned on CT interpretation on either scan in 2014 or 2019.    Discussed  that we will continue with regular lung cancer screening and nodule follow up in 1 year, as she does not currently have any symptoms suggestive of lung cancer.   Low-dose lung cancer screening CT will be ordered at the next visit closer to the 1 year tawanna of November 2020.      Patient also reports significant symptomatic improvement with her Trelegy inhaler.  However she was informed that this was not covered currently by her insurance.  We are currently working on a prior authorization.  Discussed that if PA is unobtainable, we will switch to Breo Ellipta and Incruse as these are the same medications will require once daily use each.  We will inform her as able with authorization results.  Discussed that we will continue to follow-up as scheduled and as needed.      Discussed that this is also significant for a left renal cyst.  She was aware of the cyst.  Discussed that we will forward the imaging report to her primary care physician there likely monitoring renal function with regular lab testing. She was appreciative of this.

## 2019-12-11 ENCOUNTER — OFFICE VISIT (OUTPATIENT)
Dept: CARDIOLOGY | Facility: CLINIC | Age: 59
End: 2019-12-11

## 2019-12-11 VITALS
DIASTOLIC BLOOD PRESSURE: 78 MMHG | BODY MASS INDEX: 26.8 KG/M2 | WEIGHT: 157 LBS | OXYGEN SATURATION: 98 % | HEIGHT: 64 IN | HEART RATE: 74 BPM | SYSTOLIC BLOOD PRESSURE: 132 MMHG

## 2019-12-11 DIAGNOSIS — R00.2 PALPITATIONS: ICD-10-CM

## 2019-12-11 DIAGNOSIS — E78.2 MIXED HYPERLIPIDEMIA: Primary | ICD-10-CM

## 2019-12-11 DIAGNOSIS — F17.210 CIGARETTE NICOTINE DEPENDENCE WITHOUT COMPLICATION: ICD-10-CM

## 2019-12-11 PROCEDURE — 99213 OFFICE O/P EST LOW 20 MIN: CPT | Performed by: INTERNAL MEDICINE

## 2019-12-11 RX ORDER — FENOFIBRATE 145 MG/1
145 TABLET, COATED ORAL DAILY
COMMUNITY

## 2019-12-11 NOTE — PROGRESS NOTES
subjective     Chief Complaint   Patient presents with   • Palpitations     Resolved,    • Chest Pain     states they found a breast nodule Left, negative, thinks may have been the chest pain   • Hyperlipidemia     History of Present Illness  Patient is 59 years old white female who is here for follow-up.  She has history of hyperlipidemia and palpitations.  She had lab work done recently.  Cholesterol is normal triglyceride is elevated she is taking fenofibrate now.  Patient continues to smoke.  Cessation of tobacco use was strongly urged.  Healthy lifestyle discussed.    Past Surgical History:   Procedure Laterality Date   • ABDOMINAL SURGERY     • APPENDECTOMY     • CHOLECYSTECTOMY     • COLONOSCOPY  2014   • ENDOSCOPY  2014   • LUNG SURGERY      biopsy   • MOUTH SURGERY      dental extractions   • SKIN BIOPSY     • TOTAL LAPAROSCOPIC HYSTERECTOMY N/A 1/16/2018    Procedure: ROBOTIC TOTAL LAPAROSCOPIC HYSTERECTOMY WITH BILATERAL SALPINGO OOPHORECTOMY;  Surgeon: Germain Topete MD;  Location: [facility];  Service:      Family History   Problem Relation Age of Onset   • Coronary artery disease Mother    • Heart attack Mother    • Diabetes Mother    • Heart disease Other    • Lung cancer Other    • Heart disease Father    • Hypertension Father    • Cancer Father    • No Known Problems Sister    • Diabetes Brother    • No Known Problems Sister    • No Known Problems Sister    • No Known Problems Sister    • Arthritis Brother      Past Medical History:   Diagnosis Date   • Ankle edema    • Anxiety and depression    • Arthritis    • Asthmatic bronchitis    • Atypical chest pain    • COPD (chronic obstructive pulmonary disease) (CMS/HCC)    • Family history of premature coronary artery disease    • GERD (gastroesophageal reflux disease)    • Hiatal hernia    • History of EKG 09/29/2015    NORMAL   • Hyperlipidemia    • Hypertension    • Lung nodules    • Osteoarthritis    • Ovarian mass, left    • Ovarian tumor  (benign), left 1/16/2018   • Recent weight loss    • Seasonal allergies    • Tobacco abuse    • Wears dentures     top plate     Patient Active Problem List   Diagnosis   • Hyperlipidemia   • GERD (gastroesophageal reflux disease)   • Tobacco use   • Obesity   • COPD (chronic obstructive pulmonary disease) (CMS/HCC)   • Atypical chest pain   • Palpitations   • Ovarian tumor (benign), left   • Cigarette nicotine dependence without complication   • Fatigue   • Multiple pulmonary nodules       Social History     Tobacco Use   • Smoking status: Current Every Day Smoker     Packs/day: 1.00     Years: 37.00     Pack years: 37.00     Types: Cigarettes   • Smokeless tobacco: Never Used   Substance Use Topics   • Alcohol use: No   • Drug use: No       No Known Allergies    Current Outpatient Medications on File Prior to Visit   Medication Sig   • albuterol sulfate  (90 Base) MCG/ACT inhaler Inhale 2 puffs Every 4 (Four) Hours As Needed for Wheezing or Shortness of Air.   • alendronate (FOSAMAX) 70 MG tablet Take 1 tablet by mouth 1 (One) Time Per Week. Prior to [facility], operated by [facility], Patient was on: takes on mondays   • cetirizine (zyrTEC) 10 MG tablet Take 10 mg by mouth Daily.   • diazepam (VALIUM) 5 MG tablet Take 5 mg by mouth 3 (three) times a day.   • diclofenac (VOLTAREN) 1 % gel gel Apply 4 g topically 3 (Three) Times a Day As Needed (applies to shoulders and hip).   • escitalopram (LEXAPRO) 20 MG tablet Take 20 mg by mouth Daily.   • estradiol (ESTRACE) 2 MG tablet Take 1 tablet by mouth Daily.   • fenofibrate (TRICOR) 145 MG tablet Take 145 mg by mouth Daily.   • fluticasone (FLONASE) 50 MCG/ACT nasal spray into each nostril.   • Fluticasone-Umeclidin-Vilant (TRELEGY ELLIPTA) 100-62.5-25 MCG/INH aerosol powder  Inhale 1 each Daily.   • folic acid (FOLVITE) 1 MG tablet Take 1 mg by mouth Daily.   • furosemide (LASIX) 20 MG tablet Take 20 mg by mouth Daily.   • gabapentin (NEURONTIN) 600 MG tablet Take 600 mg by  "mouth 3 (Three) Times a Day.   • HYDROcodone-acetaminophen (NORCO)  MG per tablet Take 1 tablet by mouth 3 (three) times a day.   • hydroxychloroquine (PLAQUENIL) 200 MG tablet    • lactulose (CHRONULAC) 10 GM/15ML solution    • levocetirizine (XYZAL) 5 MG tablet    • metoclopramide (REGLAN) 10 MG tablet Take 10 mg by mouth Daily.   • montelukast (SINGULAIR) 10 MG tablet Take 1 tablet by mouth Every Night.   • pantoprazole (PROTONIX) 40 MG EC tablet Take 40 mg by mouth 2 (Two) Times a Day.   • vitamin C (ASCORBIC ACID) 250 MG tablet Take 250 mg by mouth Daily.   • vitamin D (ERGOCALCIFEROL) 95214 units capsule capsule Take 50,000 Units by mouth Every 30 (Thirty) Days.   • zolpidem (AMBIEN) 5 MG tablet      No current facility-administered medications on file prior to visit.          The following portions of the patient's history were reviewed and updated as appropriate: allergies, current medications, past family history, past medical history, past social history, past surgical history and problem list.    Review of Systems   Constitution: Negative.   HENT: Negative.  Negative for congestion.    Eyes: Negative.    Cardiovascular: Negative.  Negative for chest pain, cyanosis, dyspnea on exertion, irregular heartbeat, leg swelling, near-syncope, orthopnea, palpitations, paroxysmal nocturnal dyspnea and syncope.   Respiratory: Negative.  Negative for shortness of breath.    Hematologic/Lymphatic: Negative.    Musculoskeletal: Negative.    Gastrointestinal: Negative.    Neurological: Negative.  Negative for headaches.          Objective:     /78 (BP Location: Left arm, Patient Position: Sitting)   Pulse 74   Ht 162.6 cm (64\")   Wt 71.2 kg (157 lb)   LMP  (LMP Unknown)   SpO2 98%   BMI 26.95 kg/m²   Physical Exam   Constitutional: She appears well-developed and well-nourished. No distress.   HENT:   Head: Normocephalic and atraumatic.   Mouth/Throat: Oropharynx is clear and moist. No oropharyngeal " exudate.   Eyes: Pupils are equal, round, and reactive to light. Conjunctivae and EOM are normal. No scleral icterus.   Neck: Normal range of motion. Neck supple. No JVD present. No tracheal deviation present. No thyromegaly present.   Cardiovascular: Normal rate, regular rhythm, normal heart sounds and intact distal pulses. PMI is not displaced. Exam reveals no gallop, no friction rub and no decreased pulses.   No murmur heard.  Pulses:       Carotid pulses are 3+ on the right side, and 3+ on the left side.       Radial pulses are 3+ on the right side, and 3+ on the left side.   Pulmonary/Chest: Effort normal and breath sounds normal. No respiratory distress. She has no wheezes. She has no rales. She exhibits no tenderness.   Abdominal: Soft. Bowel sounds are normal. She exhibits no distension, no abdominal bruit and no mass. There is no splenomegaly or hepatomegaly. There is no tenderness. There is no rebound and no guarding.   Musculoskeletal: Normal range of motion. She exhibits no edema, tenderness or deformity.   Lymphadenopathy:     She has no cervical adenopathy.   Neurological: She is alert. She has normal reflexes. No cranial nerve deficit. She exhibits normal muscle tone. Coordination normal.   Skin: Skin is warm and dry. No rash noted. She is not diaphoretic. No erythema.   Psychiatric: She has a normal mood and affect. Her behavior is normal. Judgment and thought content normal.         Lab Review  Lab Results   Component Value Date     01/11/2018    K 4.1 01/11/2018     01/11/2018    BUN 6 (L) 01/11/2018    CREATININE 0.79 01/11/2018    GLUCOSE 80 01/11/2018    CALCIUM 9.0 01/11/2018    ALT 24 02/20/2014    ALKPHOS 68 02/20/2014    LABIL2 1.6 02/20/2014     No results found for: CKTOTAL  Lab Results   Component Value Date    WBC 15.79 (H) 01/17/2018    HGB 12.4 01/17/2018    HCT 39.1 01/17/2018     01/17/2018     No results found for: INR  No results found for: MG  No results found  for: PSA, TSH  No results found for: BNP  Lab Results   Component Value Date    CHLPL 165 02/20/2014    TRIG 352 (H) 02/20/2014    HDL 32 (L) 02/20/2014    VLDL 70 (H) 02/20/2014     Lab Results   Component Value Date    LDL 62 02/20/2014       Procedures       I personally viewed and interpreted the patient's LAB data         Assessment:     1. Mixed hyperlipidemia    2. Palpitations    3. Cigarette nicotine dependence without complication          Plan:     Patient has hypertriglyceridemia and is taking TriCor.  He is also trying to diet.  Healthy lifestyle exercise program discussed.  Unfortunately patient continues to smoke.  Cessation of tobacco use was stressed.        No follow-ups on file.

## 2020-05-05 ENCOUNTER — OFFICE VISIT (OUTPATIENT)
Dept: PULMONOLOGY | Facility: CLINIC | Age: 60
End: 2020-05-05

## 2020-05-05 DIAGNOSIS — F17.200 CURRENT SMOKER: ICD-10-CM

## 2020-05-05 DIAGNOSIS — R91.8 MULTIPLE PULMONARY NODULES: ICD-10-CM

## 2020-05-05 DIAGNOSIS — J43.2 CENTRILOBULAR EMPHYSEMA (HCC): Primary | ICD-10-CM

## 2020-05-05 DIAGNOSIS — G47.33 OSA (OBSTRUCTIVE SLEEP APNEA): ICD-10-CM

## 2020-05-05 PROCEDURE — 99406 BEHAV CHNG SMOKING 3-10 MIN: CPT | Performed by: INTERNAL MEDICINE

## 2020-05-05 PROCEDURE — 99442 PR PHYS/QHP TELEPHONE EVALUATION 11-20 MIN: CPT | Performed by: INTERNAL MEDICINE

## 2020-05-05 RX ORDER — ALBUTEROL SULFATE 90 UG/1
2 AEROSOL, METERED RESPIRATORY (INHALATION) EVERY 4 HOURS PRN
Qty: 1 INHALER | Refills: 8 | Status: SHIPPED | OUTPATIENT
Start: 2020-05-05 | End: 2020-10-13 | Stop reason: SDUPTHER

## 2020-05-05 NOTE — PROGRESS NOTES
You have chosen to receive care through a telephone visit. Do you consent to use a telephone visit for your medical care today? Yes    Subjective    Alanna Concepcion presents for the following Lung Nodule  .    History of Present Illness     Ms. Concepcion is a very pleasant 60-year-old female with a past medical history of COPD, current smoking, obstructive sleep apnea and bilateral pulmonary nodules.  She reports significant improvement in her breathing at rest and on exertion since she was started on Trelegy Ellipta inhaler.  She is using her albuterol inhaler as needed.  She reports that she has cut down on her smoking.  She reports that her shortness of breath mildly increased with activity and decreases on rest.  She denies any chest pain, wheezing and coughing.  She denies any fever or chills.  She does complain of excessive daytime sleepiness and unrefreshed sleep.  She was told by family member that she snores while sleeping.       Review of system  ROS  General: Negative for fatigue or fever  Psychological: Negative for anxiety or depression  Ophthalmic: Negative for blurry vision or double vision  ENT: Negative for epistaxis or hearing changes  Allergy and immunology: Negative for hives or nasal congestion  Hematological and lymphatic: Negative for jaundice or night sweats  Endocrine: Negative for lethargy, temperature intolerance  Respiratory: Improvement in shortness of breath.  Negative for cough.  Negative for wheezing.  Cardiovascular: Negative for chest pain.  Positive for dyspnea on exertion  Gastrointestinal: No abdominal pain, no change in bowel habits  Musculoskeletal: Negative for joint swelling and joint pain  Neurological: No TIA or stroke symptoms  Dermatological: Negative for acne or eczema      Past Medical History:  Past Medical History:   Diagnosis Date   • Ankle edema    • Anxiety and depression    • Arthritis    • Asthmatic bronchitis    • Atypical chest pain    • COPD (chronic obstructive  pulmonary disease) (CMS/HCC)    • Family history of premature coronary artery disease    • GERD (gastroesophageal reflux disease)    • Hiatal hernia    • History of EKG 09/29/2015    NORMAL   • Hyperlipidemia    • Hypertension    • Lung nodules    • Osteoarthritis    • Ovarian mass, left    • Ovarian tumor (benign), left 1/16/2018   • Recent weight loss    • Seasonal allergies    • Tobacco abuse    • Wears dentures     top plate       Family History:  Family History   Problem Relation Age of Onset   • Coronary artery disease Mother    • Heart attack Mother    • Diabetes Mother    • Heart disease Other    • Lung cancer Other    • Heart disease Father    • Hypertension Father    • Cancer Father    • No Known Problems Sister    • Diabetes Brother    • No Known Problems Sister    • No Known Problems Sister    • No Known Problems Sister    • Arthritis Brother        Social History:  Social History     Tobacco Use   • Smoking status: Current Every Day Smoker     Packs/day: 1.50     Years: 37.00     Pack years: 55.50     Types: Cigarettes   • Smokeless tobacco: Never Used   Substance Use Topics   • Alcohol use: No       Current Medications:  Current Outpatient Medications   Medication Sig Dispense Refill   • albuterol sulfate  (90 Base) MCG/ACT inhaler Inhale 2 puffs Every 4 (Four) Hours As Needed for Wheezing or Shortness of Air. 1 inhaler 8   • alendronate (FOSAMAX) 70 MG tablet Take 1 tablet by mouth 1 (One) Time Per Week. Prior to [facility] Admission, Patient was on: takes on mondays     • cetirizine (zyrTEC) 10 MG tablet Take 10 mg by mouth Daily.  2   • diazepam (VALIUM) 5 MG tablet Take 5 mg by mouth 3 (three) times a day.     • diclofenac (VOLTAREN) 1 % gel gel Apply 4 g topically 3 (Three) Times a Day As Needed (applies to shoulders and hip).     • escitalopram (LEXAPRO) 20 MG tablet Take 20 mg by mouth Daily.     • estradiol (ESTRACE) 2 MG tablet Take 1 tablet by mouth Daily. 100 tablet 3   • fenofibrate  (TRICOR) 145 MG tablet Take 145 mg by mouth Daily.     • fluticasone (FLONASE) 50 MCG/ACT nasal spray into each nostril.     • Fluticasone-Umeclidin-Vilant (Trelegy Ellipta) 100-62.5-25 MCG/INH aerosol powder  Inhale 1 puff Daily. 1 each 11   • folic acid (FOLVITE) 1 MG tablet Take 1 mg by mouth Daily.  1   • furosemide (LASIX) 20 MG tablet Take 20 mg by mouth Daily.     • gabapentin (NEURONTIN) 600 MG tablet Take 600 mg by mouth 3 (Three) Times a Day.     • HYDROcodone-acetaminophen (NORCO)  MG per tablet Take 1 tablet by mouth 3 (three) times a day.     • hydroxychloroquine (PLAQUENIL) 200 MG tablet   11   • levocetirizine (XYZAL) 5 MG tablet   1   • metoclopramide (REGLAN) 10 MG tablet Take 10 mg by mouth Daily.     • montelukast (SINGULAIR) 10 MG tablet Take 1 tablet by mouth Every Night. 30 tablet 8   • pantoprazole (PROTONIX) 40 MG EC tablet Take 40 mg by mouth 2 (Two) Times a Day.     • vitamin C (ASCORBIC ACID) 250 MG tablet Take 250 mg by mouth Daily.     • vitamin D (ERGOCALCIFEROL) 97714 units capsule capsule Take 50,000 Units by mouth Every 30 (Thirty) Days.     • zolpidem (AMBIEN) 5 MG tablet   0   • lactulose (CHRONULAC) 10 GM/15ML solution   1     No current facility-administered medications for this visit.        Allergies:  No Known Allergies    Vitals:  LMP  (LMP Unknown)     Results for orders placed or performed during the hospital encounter of 01/16/18   CBC (No Diff)   Result Value Ref Range    WBC 15.79 (H) 4.50 - 12.50 10*3/mm3    RBC 4.17 (L) 4.20 - 5.40 10*6/mm3    Hemoglobin 12.4 12.0 - 16.0 g/dL    Hematocrit 39.1 37.0 - 47.0 %    MCV 93.8 80.0 - 94.0 fL    MCH 29.7 27.0 - 33.0 pg    MCHC 31.7 (L) 33.0 - 37.0 g/dL    RDW 14.3 11.5 - 14.5 %    RDW-SD 47.0 37.0 - 54.0 fl    MPV 11.4 (H) 6.0 - 10.0 fL    Platelets 199 130 - 400 10*3/mm3   Tissue Pathology Exam - Tissue, Uterus with Cervix, Bilateral Tubes and Ovaries   Result Value Ref Range    Case Report       Surgical Pathology  Report                         Case: [id number]                                  Authorizing Provider:  Germain Topete MD       Collected:           01/16/2018 10:37 AM          Ordering Location:     [facility]      Received:            01/16/2018 01:08 PM                                 OPERATING ROOM DEPARTMENT                                                    Pathologist:           Chuyita Naranjo MD                                                        Specimen:    Uterus with Cervix, Bilateral Tubes and Ovaries                                            Final Diagnosis       See Scanned Report        Embedded Images         Objective      Physical Exam  Physical examination could not be performed as care was provided via telephone visit    I have reviewed the past medical history, past surgical history, family history and social history of the patient.        Labs:  No results found for: PHART, SDE3NAI, PO2ART, PTC9LYT, BASEEXCESS, Z2WDBJFM  Lab Results   Component Value Date    GLUCOSE 80 01/11/2018    CALCIUM 9.0 01/11/2018     01/11/2018    K 4.1 01/11/2018    CO2 30.7 01/11/2018     01/11/2018    BUN 6 (L) 01/11/2018    CREATININE 0.79 01/11/2018    EGFRIFNONA 75 01/11/2018    BCR 7.6 01/11/2018    ANIONGAP 3.3 (L) 01/11/2018     Lab Results   Component Value Date    WBC 15.79 (H) 01/17/2018    HGB 12.4 01/17/2018    HCT 39.1 01/17/2018    MCV 93.8 01/17/2018     01/17/2018         Imaging  I have reviewed the last CT scan of the chest.      Assessment/Plan    Diagnosis Plan   1. Centrilobular emphysema (CMS/HCC)  Fluticasone-Umeclidin-Vilant (Trelegy Ellipta) 100-62.5-25 MCG/INH aerosol powder     albuterol sulfate  (90 Base) MCG/ACT inhaler   2. COOPER (obstructive sleep apnea)  Polysomnography 4 or More Parameters With CPAP   3. Current smoker     4. Multiple pulmonary nodules  CT Chest Without Contrast         Smoking cessation counseling:     Alanna Concepcion   reports that she has been smoking cigarettes. She has a 55.50 pack-year smoking history. She has never used smokeless tobacco.. I have educated her on the risk of diseases from using tobacco products such as cancer, COPD and heart diease.     I advised her to quit and she is willing to quit. We have discussed the following method/s for tobacco cessation:  Counseling.  Together we have set a quit date for 1 month from today.  She will follow up with me in 3 months or sooner to check on her progress.    I spent 7 minutes counseling the patient.         Follow up in 3 months and as needed.            This visit has been rescheduled as a phone visit to comply with patient safety concerns in accordance with CDC recommendations. Total time of discussion was 15 minutes.

## 2020-05-12 ENCOUNTER — HOSPITAL ENCOUNTER (OUTPATIENT)
Dept: CT IMAGING | Facility: HOSPITAL | Age: 60
Discharge: HOME | End: 2020-05-12
Admitting: INTERNAL MEDICINE

## 2020-05-12 DIAGNOSIS — R91.8 MULTIPLE PULMONARY NODULES: ICD-10-CM

## 2020-05-12 PROCEDURE — 71250 CT THORAX DX C-: CPT

## 2020-05-12 PROCEDURE — 71250 CT THORAX DX C-: CPT | Performed by: RADIOLOGY

## 2020-05-14 ENCOUNTER — TELEPHONE (OUTPATIENT)
Dept: PULMONOLOGY | Facility: CLINIC | Age: 60
End: 2020-05-14

## 2020-05-14 DIAGNOSIS — R91.8 MULTIPLE PULMONARY NODULES: ICD-10-CM

## 2020-05-14 DIAGNOSIS — F17.210 CIGARETTE NICOTINE DEPENDENCE WITHOUT COMPLICATION: Primary | ICD-10-CM

## 2020-05-14 NOTE — TELEPHONE ENCOUNTER
"Per Dr. Edouard:   \"Please update the patient that she has multiple nodules on the CT scan of the chest.  Because of the smoking history, we would like to do a PET scan to be sure that there is no activity on these nodules.  As per lung nodule risk calculator she is at intermediate risk; 14%.  Please order PET scan for her.  If the PET scan is negative then we will follow low risk protocol that includes serial CTs and if the PET scan is positive then we will send her to Dr. La for possible VATS biopsy.\"      I explained to her that although repeat imaging showed stability of several pulmonary nodules, all non-calcified, she was recommended for PET scan as there is concern of malignancy. Discussed that the PET scan would not give a definite cancer diagnosis, but would help suggest if there is a need for further assessment by biopsy versus likely benign etiology and recommendation for follow up CT imaging. Based on the results, we would then discuss the need for biopsy if the PET scan was suggestive of increased cellular activity and malignancy.   She conveyed understanding and agreed to proceeding with the PET scan.   · PET scan ordered for multiple bilateral pulmonary nodules.   "

## 2020-05-29 ENCOUNTER — HOSPITAL ENCOUNTER (OUTPATIENT)
Dept: PET IMAGING | Facility: HOSPITAL | Age: 60
Discharge: HOME | End: 2020-05-29
Admitting: PHYSICIAN ASSISTANT

## 2020-05-29 DIAGNOSIS — R91.8 MULTIPLE PULMONARY NODULES: ICD-10-CM

## 2020-05-29 PROCEDURE — A9552 F18 FDG: HCPCS | Performed by: PHYSICIAN ASSISTANT

## 2020-05-29 PROCEDURE — 0 FLUDEOXYGLUCOSE F18 SOLUTION: Performed by: PHYSICIAN ASSISTANT

## 2020-05-29 PROCEDURE — 78815 PET IMAGE W/CT SKULL-THIGH: CPT

## 2020-05-29 PROCEDURE — 78815 PET IMAGE W/CT SKULL-THIGH: CPT | Performed by: RADIOLOGY

## 2020-05-29 RX ADMIN — FLUDEOXYGLUCOSE F18 1 DOSE: 300 INJECTION INTRAVENOUS at 09:25

## 2020-06-02 ENCOUNTER — TELEPHONE (OUTPATIENT)
Dept: PULMONOLOGY | Facility: CLINIC | Age: 60
End: 2020-06-02

## 2020-06-02 DIAGNOSIS — R91.8 MULTIPLE PULMONARY NODULES: ICD-10-CM

## 2020-06-02 DIAGNOSIS — F17.210 CIGARETTE NICOTINE DEPENDENCE WITHOUT COMPLICATION: Primary | ICD-10-CM

## 2020-06-02 NOTE — TELEPHONE ENCOUNTER
Called patient with PET scan results after review with Dr. Edouard.        Dr. Edouard recommended repeat CT imaging in 1 year as findings were not concerning for cancer at this time. She was agreeable to repeat imaging in 1 year.   · Ordered CT chest without contrast for May 2021.     If all continues to appear stable, we will then proceed with low dose Ct for lung cancer screenings.

## 2020-06-09 ENCOUNTER — OFFICE VISIT (OUTPATIENT)
Dept: CARDIOLOGY | Facility: CLINIC | Age: 60
End: 2020-06-09

## 2020-06-09 VITALS
BODY MASS INDEX: 27.83 KG/M2 | SYSTOLIC BLOOD PRESSURE: 116 MMHG | DIASTOLIC BLOOD PRESSURE: 74 MMHG | HEART RATE: 63 BPM | TEMPERATURE: 98.9 F | OXYGEN SATURATION: 97 % | WEIGHT: 163 LBS | HEIGHT: 64 IN

## 2020-06-09 DIAGNOSIS — Z72.0 TOBACCO USE: ICD-10-CM

## 2020-06-09 DIAGNOSIS — J43.2 CENTRILOBULAR EMPHYSEMA (HCC): ICD-10-CM

## 2020-06-09 DIAGNOSIS — E78.2 MIXED HYPERLIPIDEMIA: ICD-10-CM

## 2020-06-09 DIAGNOSIS — R00.2 PALPITATIONS: Primary | ICD-10-CM

## 2020-06-09 PROCEDURE — 99213 OFFICE O/P EST LOW 20 MIN: CPT | Performed by: INTERNAL MEDICINE

## 2020-06-09 RX ORDER — LINACLOTIDE 290 UG/1
CAPSULE, GELATIN COATED ORAL
COMMUNITY
Start: 2020-05-26 | End: 2022-01-13 | Stop reason: ALTCHOICE

## 2020-06-09 NOTE — PROGRESS NOTES
subjective     Chief Complaint   Patient presents with   • Palpitations     Follow up, pt denies any symptoms   • Edema     bilat ankles     History of Present Illness  Patient is 60 years old white female who has history of palpitations.  Palpitations are not bad.  She is not taking any medications.  Patient has COPD and is taking albuterol along with Trelegy Ellipta.  Beta-blocker therapy was not given because of COPD.  Patient thinks that she is not bothered by palpitations too much.  Blood pressure is normal.  She has hyperlipidemia and is taking TriCor 145 daily.  She complains of mild lower extremity edema however she does not have any swelling today.  Patient has bilateral pulmonary nodules recent PET scan was negative but CT scan showed 9 mm and 7 mm nodule.  Cessation of tobacco use was stressed.  She denies any chest pain or shortness of breath    Past Surgical History:   Procedure Laterality Date   • ABDOMINAL SURGERY     • APPENDECTOMY     • CHOLECYSTECTOMY     • COLONOSCOPY  2014   • ENDOSCOPY  2014   • LUNG SURGERY      biopsy   • MOUTH SURGERY      dental extractions   • SKIN BIOPSY     • TOTAL LAPAROSCOPIC HYSTERECTOMY N/A 1/16/2018    Procedure: ROBOTIC TOTAL LAPAROSCOPIC HYSTERECTOMY WITH BILATERAL SALPINGO OOPHORECTOMY;  Surgeon: Germain Topete MD;  Location: [facility];  Service:      Family History   Problem Relation Age of Onset   • Coronary artery disease Mother    • Heart attack Mother    • Diabetes Mother    • Heart disease Other    • Lung cancer Other    • Heart disease Father    • Hypertension Father    • Cancer Father    • No Known Problems Sister    • Diabetes Brother    • No Known Problems Sister    • No Known Problems Sister    • No Known Problems Sister    • Arthritis Brother      Past Medical History:   Diagnosis Date   • Ankle edema    • Anxiety and depression    • Arthritis    • Asthmatic bronchitis    • Atypical chest pain    • COPD (chronic obstructive pulmonary disease)  (CMS/[facility])    • Family history of premature coronary artery disease    • GERD (gastroesophageal reflux disease)    • Hiatal hernia    • History of EKG 09/29/2015    NORMAL   • Hyperlipidemia    • Hypertension    • Lung nodules    • Osteoarthritis    • Ovarian mass, left    • Ovarian tumor (benign), left 1/16/2018   • Recent weight loss    • Seasonal allergies    • Tobacco abuse    • Wears dentures     top plate     Patient Active Problem List   Diagnosis   • Hyperlipidemia   • GERD (gastroesophageal reflux disease)   • Tobacco use   • Obesity   • COPD (chronic obstructive pulmonary disease) (CMS/[facility])   • Atypical chest pain   • Palpitations   • Ovarian tumor (benign), left   • Cigarette nicotine dependence without complication   • Fatigue   • Multiple pulmonary nodules       Social History     Tobacco Use   • Smoking status: Current Every Day Smoker     Packs/day: 1.50     Years: 37.00     Pack years: 55.50     Types: Cigarettes   • Smokeless tobacco: Never Used   Substance Use Topics   • Alcohol use: No   • Drug use: No       No Known Allergies    Current Outpatient Medications on File Prior to Visit   Medication Sig   • albuterol sulfate  (90 Base) MCG/ACT inhaler Inhale 2 puffs Every 4 (Four) Hours As Needed for Wheezing or Shortness of Air.   • alendronate (FOSAMAX) 70 MG tablet Take 1 tablet by mouth 1 (One) Time Per Week. Prior to [facility] Admission, Patient was on: takes on mondays   • diazepam (VALIUM) 5 MG tablet Take 5 mg by mouth 3 (three) times a day.   • diclofenac (VOLTAREN) 1 % gel gel Apply 4 g topically 3 (Three) Times a Day As Needed (applies to shoulders and hip).   • escitalopram (LEXAPRO) 20 MG tablet Take 20 mg by mouth Daily.   • estradiol (ESTRACE) 2 MG tablet Take 1 tablet by mouth Daily.   • fenofibrate (TRICOR) 145 MG tablet Take 145 mg by mouth Daily.   • fluticasone (FLONASE) 50 MCG/ACT nasal spray into each nostril.   • Fluticasone-Umeclidin-Vilant (Trelegy Ellipta) 100-62.5-25  MCG/INH aerosol powder  Inhale 1 puff Daily.   • folic acid (FOLVITE) 1 MG tablet Take 1 mg by mouth Daily.   • furosemide (LASIX) 20 MG tablet Take 20 mg by mouth Daily.   • gabapentin (NEURONTIN) 600 MG tablet Take 600 mg by mouth 3 (Three) Times a Day.   • HYDROcodone-acetaminophen (NORCO)  MG per tablet Take 1 tablet by mouth 3 (three) times a day.   • hydroxychloroquine (PLAQUENIL) 200 MG tablet    • lactulose (CHRONULAC) 10 GM/15ML solution    • levocetirizine (XYZAL) 5 MG tablet    • metoclopramide (REGLAN) 10 MG tablet Take 10 mg by mouth Daily.   • montelukast (SINGULAIR) 10 MG tablet Take 1 tablet by mouth Every Night.   • pantoprazole (PROTONIX) 40 MG EC tablet Take 40 mg by mouth 2 (Two) Times a Day.   • vitamin C (ASCORBIC ACID) 250 MG tablet Take 250 mg by mouth Daily.   • vitamin D (ERGOCALCIFEROL) 31137 units capsule capsule Take 50,000 Units by mouth Every 30 (Thirty) Days.   • zolpidem (AMBIEN) 5 MG tablet    • LINZESS 290 MCG capsule capsule    • [DISCONTINUED] cetirizine (zyrTEC) 10 MG tablet Take 10 mg by mouth Daily.     No current facility-administered medications on file prior to visit.          The following portions of the patient's history were reviewed and updated as appropriate: allergies, current medications, past family history, past medical history, past social history, past surgical history and problem list.    Review of Systems   Constitution: Negative.   HENT: Negative.  Negative for congestion.    Eyes: Negative.    Cardiovascular: Positive for leg swelling and palpitations. Negative for chest pain, cyanosis, dyspnea on exertion, irregular heartbeat, near-syncope, orthopnea, paroxysmal nocturnal dyspnea and syncope.   Respiratory: Negative.  Negative for shortness of breath.    Hematologic/Lymphatic: Negative.    Musculoskeletal: Negative.    Gastrointestinal: Negative.    Neurological: Negative.  Negative for headaches.          Objective:     /74 (BP Location: Left  "arm, Patient Position: Sitting, Cuff Size: Adult)   Pulse 63   Temp 98.9 °F (37.2 °C)   Ht 162.6 cm (64\")   Wt 73.9 kg (163 lb)   LMP  (LMP Unknown)   SpO2 97%   BMI 27.98 kg/m²   Physical Exam   Constitutional: She appears well-developed and well-nourished. No distress.   HENT:   Head: Normocephalic and atraumatic.   Mouth/Throat: Oropharynx is clear and moist. No oropharyngeal exudate.   Eyes: Pupils are equal, round, and reactive to light. Conjunctivae and EOM are normal. No scleral icterus.   Neck: Normal range of motion. Neck supple. No JVD present. No tracheal deviation present. No thyromegaly present.   Cardiovascular: Normal rate, regular rhythm, normal heart sounds and intact distal pulses. PMI is not displaced. Exam reveals no gallop, no friction rub and no decreased pulses.   No murmur heard.  Pulses:       Carotid pulses are 3+ on the right side, and 3+ on the left side.       Radial pulses are 3+ on the right side, and 3+ on the left side.   Pulmonary/Chest: Effort normal and breath sounds normal. No respiratory distress. She has no wheezes. She has no rales. She exhibits no tenderness.   Abdominal: Soft. Bowel sounds are normal. She exhibits no distension, no abdominal bruit and no mass. There is no splenomegaly or hepatomegaly. There is no tenderness. There is no rebound and no guarding.   Musculoskeletal: Normal range of motion. She exhibits no edema, tenderness or deformity.   Lymphadenopathy:     She has no cervical adenopathy.   Neurological: She is alert. She has normal reflexes. No cranial nerve deficit. She exhibits normal muscle tone. Coordination normal.   Skin: Skin is warm and dry. No rash noted. She is not diaphoretic. No erythema.   Psychiatric: She has a normal mood and affect. Her behavior is normal. Judgment and thought content normal.         Lab Review    Procedures       I personally viewed and interpreted the patient's LAB data         Assessment:     1. Palpitations    2. " Mixed hyperlipidemia    3. Tobacco use    4. Centrilobular emphysema (CMS/HCC)          Plan:     Patient complains of palpitations which are mild.  She is taking albuterol and Trelegy Ellipta which makes it slightly worse.  Because of her COPD beta-blocker therapy was not started.  She was advised to stop smoking particularly because she has been pulmonary nodules already and has COPD.  Weight loss was also emphasized.  No change in therapy was made she will be reevaluated in 6 months        No follow-ups on file.

## 2020-10-13 ENCOUNTER — OFFICE VISIT (OUTPATIENT)
Dept: PULMONOLOGY | Facility: CLINIC | Age: 60
End: 2020-10-13

## 2020-10-13 DIAGNOSIS — J43.2 CENTRILOBULAR EMPHYSEMA (HCC): Primary | ICD-10-CM

## 2020-10-13 DIAGNOSIS — R91.8 MULTIPLE PULMONARY NODULES: ICD-10-CM

## 2020-10-13 DIAGNOSIS — F17.210 CIGARETTE NICOTINE DEPENDENCE WITHOUT COMPLICATION: ICD-10-CM

## 2020-10-13 PROCEDURE — 99442 PR PHYS/QHP TELEPHONE EVALUATION 11-20 MIN: CPT | Performed by: PHYSICIAN ASSISTANT

## 2020-10-13 RX ORDER — CELECOXIB 100 MG/1
CAPSULE ORAL
COMMUNITY
Start: 2020-09-22 | End: 2021-03-02 | Stop reason: ALTCHOICE

## 2020-10-13 RX ORDER — ALBUTEROL SULFATE 90 UG/1
2 AEROSOL, METERED RESPIRATORY (INHALATION) EVERY 4 HOURS PRN
Qty: 18 G | Refills: 8 | Status: SHIPPED | OUTPATIENT
Start: 2020-10-13

## 2020-10-13 RX ORDER — IPRATROPIUM BROMIDE AND ALBUTEROL SULFATE 2.5; .5 MG/3ML; MG/3ML
3 SOLUTION RESPIRATORY (INHALATION) 4 TIMES DAILY PRN
Qty: 120 ML | Refills: 11 | Status: SHIPPED | OUTPATIENT
Start: 2020-10-13

## 2020-10-13 RX ORDER — ERYTHROMYCIN 333 MG/1
TABLET, DELAYED RELEASE ORAL
COMMUNITY
Start: 2020-08-11 | End: 2021-02-02

## 2020-10-13 RX ORDER — DOCUSATE SODIUM 100 MG
CAPSULE ORAL
COMMUNITY
Start: 2020-09-22

## 2020-10-13 NOTE — PROGRESS NOTES
You have chosen to receive care through a telephone visit. Do you consent to use a telephone visit for your medical care today? Yes        Subjective    Alanna Concepcion presents for the following Emphysema      History of Present Illness     Mrs. Concepcion presents by telephone today for follow up of centrilobular emphysema, smoker, pulmonary nodules, and obstructive sleep apnea.   She continues to smoke approximately 1 pack per day.   She notes difficulty with phlegm production.  Has significant phlegm production, typically worse in the morning after postnasal drainage.  States that other symptoms including shortness of breath, intermittent cough are otherwise stable at this time on use of Trelegy once daily.  Did not previously qualify for supplemental oxygen.  Using albuterol neb treatments and albuterol inhaler as needed.  Requested something to help decrease phlegm production.  Not previously improved with use of Mucinex and noticed phlegm thickening instead.   No recent fever, chills. No known hemoptysis.       Review of Systems   Constitutional: Negative for activity change, chills, fatigue and fever.   HENT: Negative for congestion and sinus pressure.    Respiratory: Positive for cough. Negative for shortness of breath and wheezing.    Endocrine: Negative for cold intolerance and heat intolerance.   Neurological: Negative for dizziness and light-headedness.           Active Problems:  Problem List Items Addressed This Visit     None          Past Medical History:  Past Medical History:   Diagnosis Date   • Ankle edema    • Anxiety and depression    • Arthritis    • Asthmatic bronchitis    • Atypical chest pain    • COPD (chronic obstructive pulmonary disease) (CMS/[facility])    • Family history of premature coronary artery disease    • GERD (gastroesophageal reflux disease)    • Hiatal hernia    • History of EKG 09/29/2015    NORMAL   • Hyperlipidemia    • Hypertension    • Lung nodules    • Osteoarthritis    • Ovarian  mass, left    • Ovarian tumor (benign), left 1/16/2018   • Recent weight loss    • Seasonal allergies    • Tobacco abuse    • Wears dentures     top plate       Family History:  Family History   Problem Relation Age of Onset   • Coronary artery disease Mother    • Heart attack Mother    • Diabetes Mother    • Heart disease Other    • Lung cancer Other    • Heart disease Father    • Hypertension Father    • Cancer Father    • No Known Problems Sister    • Diabetes Brother    • No Known Problems Sister    • No Known Problems Sister    • No Known Problems Sister    • Arthritis Brother        Social History:  Social History     Tobacco Use   • Smoking status: Current Every Day Smoker     Packs/day: 1.50     Years: 37.00     Pack years: 55.50     Types: Cigarettes   • Smokeless tobacco: Never Used   Substance Use Topics   • Alcohol use: No       Current Medications:  Current Outpatient Medications   Medication Sig Dispense Refill   • albuterol sulfate  (90 Base) MCG/ACT inhaler Inhale 2 puffs Every 4 (Four) Hours As Needed for Wheezing or Shortness of Air. 1 inhaler 8   • alendronate (FOSAMAX) 70 MG tablet Take 1 tablet by mouth 1 (One) Time Per Week. Prior to [facility] Admission, Patient was on: takes on mondays     • escitalopram (LEXAPRO) 20 MG tablet Take 20 mg by mouth Daily.     • estradiol (ESTRACE) 2 MG tablet Take 1 tablet by mouth Daily. 100 tablet 3   • fenofibrate (TRICOR) 145 MG tablet Take 145 mg by mouth Daily.     • fluticasone (FLONASE) 50 MCG/ACT nasal spray into each nostril.     • Fluticasone-Umeclidin-Vilant (Trelegy Ellipta) 100-62.5-25 MCG/INH aerosol powder  Inhale 1 puff Daily. 1 each 11   • folic acid (FOLVITE) 1 MG tablet Take 1 mg by mouth Daily.  1   • furosemide (LASIX) 20 MG tablet Take 20 mg by mouth Daily.     • gabapentin (NEURONTIN) 600 MG tablet Take 600 mg by mouth 3 (Three) Times a Day.     • HYDROcodone-acetaminophen (NORCO)  MG per tablet Take 1 tablet by mouth 3 (three)  times a day.     • hydroxychloroquine (PLAQUENIL) 200 MG tablet   11   • lactulose (CHRONULAC) 10 GM/15ML solution   1   • levocetirizine (XYZAL) 5 MG tablet   1   • LINZESS 290 MCG capsule capsule      • metoclopramide (REGLAN) 10 MG tablet Take 10 mg by mouth Daily.     • montelukast (SINGULAIR) 10 MG tablet Take 1 tablet by mouth Every Night. 30 tablet 8   • pantoprazole (PROTONIX) 40 MG EC tablet Take 40 mg by mouth 2 (Two) Times a Day.     • vitamin C (ASCORBIC ACID) 250 MG tablet Take 250 mg by mouth Daily.     • vitamin D (ERGOCALCIFEROL) 38023 units capsule capsule Take 50,000 Units by mouth Every 30 (Thirty) Days.     • zolpidem (AMBIEN) 5 MG tablet   0   • celecoxib (CeleBREX) 100 MG capsule      • diazepam (VALIUM) 5 MG tablet Take 5 mg by mouth 3 (three) times a day.     • diclofenac (VOLTAREN) 3 % gel gel      • Erythromycin 333 MG EC tablet      • nystatin (MYCOSTATIN) 672408 UNIT/ML suspension      • Stool Softener 100 MG capsule        No current facility-administered medications for this visit.        Allergies:  No Known Allergies    Vitals:  LMP  (LMP Unknown)     Imaging:    Imaging Results (Most Recent)     None          Pulmonary Functions Testing Results:    No results found for: FEV1, FVC, UBU3QUY, TLC, DLCO    Results for orders placed or performed during the hospital encounter of 01/16/18   CBC (No Diff)    Specimen: Blood   Result Value Ref Range    WBC 15.79 (H) 4.50 - 12.50 10*3/mm3    RBC 4.17 (L) 4.20 - 5.40 10*6/mm3    Hemoglobin 12.4 12.0 - 16.0 g/dL    Hematocrit 39.1 37.0 - 47.0 %    MCV 93.8 80.0 - 94.0 fL    MCH 29.7 27.0 - 33.0 pg    MCHC 31.7 (L) 33.0 - 37.0 g/dL    RDW 14.3 11.5 - 14.5 %    RDW-SD 47.0 37.0 - 54.0 fl    MPV 11.4 (H) 6.0 - 10.0 fL    Platelets 199 130 - 400 10*3/mm3   Tissue Pathology Exam - Tissue, Uterus with Cervix, Bilateral Tubes and Ovaries    Specimen: Uterus with Cervix, Bilateral Tubes and Ovaries; Tissue   Result Value Ref Range    Case Report        Surgical Pathology Report                         Case: [id number]                                  Authorizing Provider:  Germain Topete MD       Collected:           01/16/2018 10:37 AM          Ordering Location:     [facility]      Received:            01/16/2018 01:08 PM                                 OPERATING ROOM DEPARTMENT                                                    Pathologist:           Chuyita Naranjo MD                                                        Specimen:    Uterus with Cervix, Bilateral Tubes and Ovaries                                            Final Diagnosis       See Scanned Report        Embedded Images         Objective   Physical Exam     Physical exam not completed due to telephone encounter.     Assessment/Plan     I have reviewed the past medical history, family history, social history, surgical history, and allergies.     Reviewed the previous CT Chest imaging and report from November 2019. Nodules remained stable 5/12/2020.  No new nodules or significant changes. Greatest measuring 9 mm.     Reviewed the PET scan from 5/29/2020. No hypermetabolic activity noted.     Reviewed the PFT from 2019. Air trapping noted. DLCO moderately reduced. Expiratory phase limited the interpretability of the test.        ICD-10-CM ICD-9-CM   1. Centrilobular emphysema (CMS/[facility])  J43.2 492.8   2. Multiple pulmonary nodules  R91.8 793.19   3. Cigarette nicotine dependence without complication  F17.210 305.1        Centrilobular emphysema:   · Continue albuterol inhaler as needed  · Ordered duoneb treatments for as needed use every 4 hours, as dual SHIMON/BUSTER may help to reduce phlegm   · Continue Trelegy ellipta 1 puff daily.   · Phlegm production not previously used with mucinex.       Pulmonary nodules:   · Nodules have remained stable over the previous imaging.   Greatest nodule measures 9 mm.   · PET scan completed in May 2020, no hypermetabolic activity.   · Dr. Edouard  recommended to repeat the CT chest in May 2021 for 1 year follow up.   CT imaging ordered.       Cigarette nicotine dependence  Continues to smoke 1 ppd average.   She is aware of the risks of continues smoking use, and is not interested in completed smoking cessation at this time.       Previously unable to tolerate PAP therapy for treatment of Obstructive Sleep Apnea.       Vaccinations: Recommend updated influenza vaccination.   Received previously pneumonia vaccination.       Return in about 3 months (around 1/13/2021), or as needed.           This visit has been rescheduled as a phone visit to comply with patient safety concerns in accordance with CDC recommendations. Total time of discussion was 20 minutes.

## 2021-01-05 ENCOUNTER — OFFICE VISIT (OUTPATIENT)
Dept: PULMONOLOGY | Facility: CLINIC | Age: 61
End: 2021-01-05

## 2021-01-05 VITALS — BODY MASS INDEX: 28.17 KG/M2 | WEIGHT: 165 LBS | HEIGHT: 64 IN

## 2021-01-05 DIAGNOSIS — J43.2 CENTRILOBULAR EMPHYSEMA (HCC): Primary | ICD-10-CM

## 2021-01-05 DIAGNOSIS — F17.210 CIGARETTE NICOTINE DEPENDENCE WITHOUT COMPLICATION: ICD-10-CM

## 2021-01-05 DIAGNOSIS — J06.9 UPPER RESPIRATORY TRACT INFECTION, UNSPECIFIED TYPE: ICD-10-CM

## 2021-01-05 DIAGNOSIS — R91.8 MULTIPLE PULMONARY NODULES: ICD-10-CM

## 2021-01-05 DIAGNOSIS — R06.02 SOB (SHORTNESS OF BREATH) ON EXERTION: ICD-10-CM

## 2021-01-05 PROCEDURE — 99442 PR PHYS/QHP TELEPHONE EVALUATION 11-20 MIN: CPT | Performed by: PHYSICIAN ASSISTANT

## 2021-01-05 RX ORDER — GUAIFENESIN 600 MG/1
600 TABLET, EXTENDED RELEASE ORAL 2 TIMES DAILY
Qty: 20 TABLET | Refills: 0 | Status: SHIPPED | OUTPATIENT
Start: 2021-01-05 | End: 2021-01-15

## 2021-01-05 RX ORDER — QUETIAPINE FUMARATE 25 MG/1
TABLET, FILM COATED ORAL
COMMUNITY
Start: 2020-12-09 | End: 2022-01-13 | Stop reason: ALTCHOICE

## 2021-01-05 RX ORDER — PREDNISONE 20 MG/1
20 TABLET ORAL 2 TIMES DAILY
Qty: 10 TABLET | Refills: 0 | Status: SHIPPED | OUTPATIENT
Start: 2021-01-05 | End: 2021-01-10

## 2021-01-05 RX ORDER — BUSPIRONE HYDROCHLORIDE 5 MG/1
TABLET ORAL
COMMUNITY
Start: 2020-11-12 | End: 2021-02-02 | Stop reason: DRUGHIGH

## 2021-01-05 RX ORDER — BUSPIRONE HYDROCHLORIDE 15 MG/1
TABLET ORAL
COMMUNITY
Start: 2020-12-09 | End: 2022-01-13 | Stop reason: ALTCHOICE

## 2021-01-05 RX ORDER — LANOLIN ALCOHOL/MO/W.PET/CERES
CREAM (GRAM) TOPICAL
COMMUNITY
Start: 2020-12-15

## 2021-01-05 RX ORDER — MULTIVIT WITH MINERALS/LUTEIN
TABLET ORAL
COMMUNITY
Start: 2020-10-22 | End: 2022-01-13 | Stop reason: ALTCHOICE

## 2021-01-05 RX ORDER — AMOXICILLIN AND CLAVULANATE POTASSIUM 875; 125 MG/1; MG/1
1 TABLET, FILM COATED ORAL 2 TIMES DAILY
Qty: 14 TABLET | Refills: 0 | Status: SHIPPED | OUTPATIENT
Start: 2021-01-05 | End: 2021-01-12

## 2021-01-05 RX ORDER — ASPIRIN 81 MG/1
TABLET, FILM COATED ORAL
COMMUNITY
Start: 2020-12-09

## 2021-01-05 NOTE — PROGRESS NOTES
You have chosen to receive care through a telephone visit. Do you consent to use a telephone visit for your medical care today? Yes      Interval history since last visit: Development of upper respiratory infection    Recent hospitalizations: none     Investigations (imaging, PFT's, labs, sleep study, record requests, etc.)    Have you had the Influenza Vaccine? yes    Would you like to receive this Vaccine today? no    Have you had the Pneumonia Vaccine?  yes   Would you like to receive this Vaccine today? no    Subjective    Alanna Concepcion presents for the following Emphysema      History of Present Illness     Patient presents today via telephone for follow-up of centrilobular emphysema, pulmonary nodules, cigarette nicotine dependence, and obstructive sleep apnea noncompliant with positive airway pressure therapy.  She again is no longer using sleep apnea therapy as she was unable to tolerate the machine and maintain proper placement throughout the night previously, that she can discontinue use.  She is not currently requiring any supplemental oxygen during the daytime or nighttime.  She reports to 3 weeks ago, she started having some ear congestion and drainage and was trying to get antibiotics from her primary care provider, but had been unable to receive these thus far.  Symptoms have now progressed to upper respiratory symptoms which include chest congestion, and occasional chest soreness from coughing.  No known fever, chills.  No known COVID-19 contacts.  No loss of taste or smell.  She continues to have some ear congestion but states that this is somewhat improved as compared to the initial onset of symptoms.  Chest congestion with phlegm production is worse in the morning.  She will note some wheezing before expelling the phlegm.  Phlegm is notable for clear/grey coloration.   She continues to use her Trelegy inhaler as well as as needed albuterol and DuoNeb treatments.  She continues to smoke approximate  1 pack/day and is not interested in cessation at this time.      Review of Systems   Constitutional: Negative for appetite change, chills and fever.   HENT: Positive for congestion, ear discharge, postnasal drip, sinus pressure and tinnitus (occassional).    Respiratory: Positive for cough, shortness of breath and wheezing.    Cardiovascular: Negative for chest pain and palpitations.   Gastrointestinal: Negative for nausea.   Endocrine: Negative for cold intolerance and heat intolerance.   Neurological: Negative for dizziness and light-headedness.       Active Problems:  Problem List Items Addressed This Visit        Other    COPD (chronic obstructive pulmonary disease) (CMS/[facility]) - Primary    Relevant Medications    predniSONE (DELTASONE) 20 MG tablet    guaiFENesin (Mucinex) 600 MG 12 hr tablet    Cigarette nicotine dependence without complication    Multiple pulmonary nodules      Other Visit Diagnoses     SOB (shortness of breath) on exertion        Relevant Orders    Adult Transthoracic Echo Complete W/ Cont if Necessary Per Protocol    XR chest 2 vw    Upper respiratory tract infection, unspecified type        Relevant Medications    amoxicillin-clavulanate (Augmentin) 875-125 MG per tablet    Other Relevant Orders    XR chest 2 vw          Past Medical History:  Past Medical History:   Diagnosis Date   • Ankle edema    • Anxiety and depression    • Arthritis    • Asthmatic bronchitis    • Atypical chest pain    • COPD (chronic obstructive pulmonary disease) (CMS/[facility])    • Family history of premature coronary artery disease    • GERD (gastroesophageal reflux disease)    • Hiatal hernia    • History of EKG 09/29/2015    NORMAL   • Hyperlipidemia    • Hypertension    • Lung nodules    • Osteoarthritis    • Ovarian mass, left    • Ovarian tumor (benign), left 1/16/2018   • Recent weight loss    • Seasonal allergies    • Tobacco abuse    • Wears dentures     top plate       Family History:  Family History   Problem  Relation Age of Onset   • Coronary artery disease Mother    • Heart attack Mother    • Diabetes Mother    • Heart disease Other    • Lung cancer Other    • Heart disease Father    • Hypertension Father    • Cancer Father    • No Known Problems Sister    • Diabetes Brother    • No Known Problems Sister    • No Known Problems Sister    • No Known Problems Sister    • Arthritis Brother        Social History:  Social History     Tobacco Use   • Smoking status: Current Every Day Smoker     Packs/day: 1.50     Years: 37.00     Pack years: 55.50     Types: Cigarettes   • Smokeless tobacco: Never Used   Substance Use Topics   • Alcohol use: No       Current Medications:  Current Outpatient Medications   Medication Sig Dispense Refill   • Adult Aspirin Regimen 81 MG EC tablet      • albuterol sulfate  (90 Base) MCG/ACT inhaler Inhale 2 puffs Every 4 (Four) Hours As Needed for Wheezing or Shortness of Air. 18 g 8   • alendronate (FOSAMAX) 70 MG tablet Take 1 tablet by mouth 1 (One) Time Per Week. Prior to [facility] Admission, Patient was on: takes on mondays     • busPIRone (BUSPAR) 5 MG tablet      • celecoxib (CeleBREX) 100 MG capsule      • diclofenac (VOLTAREN) 3 % gel gel      • Erythromycin 333 MG EC tablet      • escitalopram (LEXAPRO) 20 MG tablet Take 20 mg by mouth Daily.     • estradiol (ESTRACE) 2 MG tablet Take 1 tablet by mouth Daily. 100 tablet 3   • fenofibrate (TRICOR) 145 MG tablet Take 145 mg by mouth Daily.     • fluticasone (FLONASE) 50 MCG/ACT nasal spray into each nostril.     • Fluticasone-Umeclidin-Vilant (Trelegy Ellipta) 100-62.5-25 MCG/INH aerosol powder  Inhale 1 puff Daily. 1 each 11   • folic acid (FOLVITE) 1 MG tablet Take 1 mg by mouth Daily.  1   • furosemide (LASIX) 20 MG tablet Take 20 mg by mouth Daily.     • gabapentin (NEURONTIN) 600 MG tablet Take 600 mg by mouth 3 (Three) Times a Day.     • HYDROcodone-acetaminophen (NORCO)  MG per tablet Take 1 tablet by mouth 3 (three)  "times a day.     • hydroxychloroquine (PLAQUENIL) 200 MG tablet   11   • ipratropium-albuterol (DUO-NEB) 0.5-2.5 mg/3 ml nebulizer Take 3 mL by nebulization 4 (Four) Times a Day As Needed for Wheezing or Shortness of Air. 120 mL 11   • lactulose (CHRONULAC) 10 GM/15ML solution   1   • levocetirizine (XYZAL) 5 MG tablet   1   • LINZESS 290 MCG capsule capsule      • melatonin 3 MG tablet      • metoclopramide (REGLAN) 10 MG tablet Take 10 mg by mouth Daily.     • montelukast (SINGULAIR) 10 MG tablet Take 1 tablet by mouth Every Night. 30 tablet 8   • nystatin (MYCOSTATIN) 818667 UNIT/ML suspension      • pantoprazole (PROTONIX) 40 MG EC tablet Take 40 mg by mouth 2 (Two) Times a Day.     • QUEtiapine (SEROquel) 25 MG tablet      • Stool Softener 100 MG capsule      • vitamin C (ASCORBIC ACID) 250 MG tablet Take 250 mg by mouth Daily.     • vitamin D (ERGOCALCIFEROL) 55506 units capsule capsule Take 50,000 Units by mouth Every 30 (Thirty) Days.     • vitamin E 1000 UNIT capsule      • zolpidem (AMBIEN) 5 MG tablet   0   • amoxicillin-clavulanate (Augmentin) 875-125 MG per tablet Take 1 tablet by mouth 2 (Two) Times a Day for 7 days. 14 tablet 0   • busPIRone (BUSPAR) 15 MG tablet      • diazepam (VALIUM) 5 MG tablet Take 5 mg by mouth 3 (three) times a day.     • guaiFENesin (Mucinex) 600 MG 12 hr tablet Take 1 tablet by mouth 2 (Two) Times a Day for 10 days. For chest congestion 20 tablet 0   • predniSONE (DELTASONE) 20 MG tablet Take 1 tablet by mouth 2 (Two) Times a Day for 5 days. 10 tablet 0     No current facility-administered medications for this visit.        Allergies:  No Known Allergies    Vitals:  Ht 162.6 cm (64\")   Wt 74.8 kg (165 lb)   LMP  (LMP Unknown)   BMI 28.32 kg/m²     Imaging:    Imaging Results (Most Recent)     None          Pulmonary Functions Testing Results:    No results found for: FEV1, FVC, PSI5KVY, TLC, DLCO    Results for orders placed or performed during the hospital encounter of " 01/16/18   CBC (No Diff)    Specimen: Blood   Result Value Ref Range    WBC 15.79 (H) 4.50 - 12.50 10*3/mm3    RBC 4.17 (L) 4.20 - 5.40 10*6/mm3    Hemoglobin 12.4 12.0 - 16.0 g/dL    Hematocrit 39.1 37.0 - 47.0 %    MCV 93.8 80.0 - 94.0 fL    MCH 29.7 27.0 - 33.0 pg    MCHC 31.7 (L) 33.0 - 37.0 g/dL    RDW 14.3 11.5 - 14.5 %    RDW-SD 47.0 37.0 - 54.0 fl    MPV 11.4 (H) 6.0 - 10.0 fL    Platelets 199 130 - 400 10*3/mm3   Tissue Pathology Exam - Tissue, Uterus with Cervix, Bilateral Tubes and Ovaries    Specimen: Uterus with Cervix, Bilateral Tubes and Ovaries; Tissue   Result Value Ref Range    Case Report       Surgical Pathology Report                         Case: [id number]                                  Authorizing Provider:  Germain Topete MD       Collected:           01/16/2018 10:37 AM          Ordering Location:     [facility]      Received:            01/16/2018 01:08 PM                                 OPERATING ROOM DEPARTMENT                                                    Pathologist:           Chuyita Naranjo MD                                                        Specimen:    Uterus with Cervix, Bilateral Tubes and Ovaries                                            Final Diagnosis       See Scanned Report        Embedded Images         Objective   Physical Exam  Physical exam not completed due to telephone encounter.    Assessment/Plan     I have reviewed the past medical history, family history, social history, surgical history, and allergies.     I reviewed the previous PET scan report from May 2020.  This was negative for hypermetabolic activity of the bilateral pulmonary nodules.  Nodule size stability was noted since 2014.    Previous CT chest from May 2020 suggested stable left lower pulmonary nodule measuring 9 mm and stable right lower pulmonary nodule measuring 7 mm.    Reviewed the previous PFT from 2019.        ICD-10-CM ICD-9-CM   1. Centrilobular emphysema  (CMS/[facility])  J43.2 492.8   2. SOB (shortness of breath) on exertion  R06.02 786.05   3. Upper respiratory tract infection, unspecified type  J06.9 465.9   4. Multiple pulmonary nodules  R91.8 793.19   5. Cigarette nicotine dependence without complication  F17.210 305.1        Centrilobular emphysema, upper respiratory infection:  · Continue albuterol inhaler as needed  · Continue DuoNeb treatments as needed  · Continue Trelegy Ellipta inhaler once daily  · On nightly Singulair tablets  · Ordered Augmentin to cover for both URI and otitis media for 7 days,   · Ordered prednisone 40 mg daily for 5 days  · Ordered Mucinex tablets to take for 10 days to assist with chest congestion.  · Ordered chest x-ray  · Ordered echocardiogram, as this can be reviewed at her next cardiology visit on January 21.    We discussed and she agreed if persistent symptoms or progressive worsening were noted to seek further evaluation as needed.      Pulmonary nodules:  Previous PET scan did not show hypermetabolic activity and suggested stability.  · Planning for repeat CT imaging in May 2021.      Cigarette nicotine dependence:  Continues to smoke approximately a pack per day average.  She is aware of the risks of continued smoking use and is not interested in in complete cessation at this time.  Briefly recommended decreasing use as able and contact us if she needs assistance with cessation.        Vaccinations: Influenza and pneumonia vaccinations are up-to-date    BMI previously calculated at 20.32.    Return in about 6 months (around 7/5/2021), or as needed.         This visit has been rescheduled as a phone visit to comply with patient safety concerns in accordance with CDC recommendations. Total time of discussion was 15 minutes.

## 2021-01-19 ENCOUNTER — APPOINTMENT (OUTPATIENT)
Dept: CARDIOLOGY | Facility: HOSPITAL | Age: 61
End: 2021-01-19

## 2021-01-26 ENCOUNTER — HOSPITAL ENCOUNTER (OUTPATIENT)
Dept: CARDIOLOGY | Facility: HOSPITAL | Age: 61
Discharge: HOME | End: 2021-01-26
Admitting: PHYSICIAN ASSISTANT

## 2021-01-26 DIAGNOSIS — R06.02 SOB (SHORTNESS OF BREATH) ON EXERTION: ICD-10-CM

## 2021-01-26 LAB
BH CV ECHO MEAS - % IVS THICK: 3 %
BH CV ECHO MEAS - % LVPW THICK: 6.2 %
BH CV ECHO MEAS - ACS: 2.1 CM
BH CV ECHO MEAS - AO MAX PG: 10 MMHG
BH CV ECHO MEAS - AO MEAN PG: 4 MMHG
BH CV ECHO MEAS - AO ROOT AREA (BSA CORRECTED): 1.9
BH CV ECHO MEAS - AO ROOT AREA: 9.6 CM^2
BH CV ECHO MEAS - AO ROOT DIAM: 3.5 CM
BH CV ECHO MEAS - AO V2 MAX: 158 CM/SEC
BH CV ECHO MEAS - AO V2 MEAN: 94.6 CM/SEC
BH CV ECHO MEAS - AO V2 VTI: 33.2 CM
BH CV ECHO MEAS - BSA(HAYCOCK): 1.9 M^2
BH CV ECHO MEAS - BSA: 1.8 M^2
BH CV ECHO MEAS - BZI_BMI: 28.3 KILOGRAMS/M^2
BH CV ECHO MEAS - BZI_METRIC_HEIGHT: 162.6 CM
BH CV ECHO MEAS - BZI_METRIC_WEIGHT: 74.8 KG
BH CV ECHO MEAS - EDV(CUBED): 107.2 ML
BH CV ECHO MEAS - EDV(MOD-SP4): 71.5 ML
BH CV ECHO MEAS - EDV(TEICH): 104.9 ML
BH CV ECHO MEAS - EF(CUBED): 71.8 %
BH CV ECHO MEAS - EF(MOD-SP4): 64.3 %
BH CV ECHO MEAS - EF(TEICH): 63.4 %
BH CV ECHO MEAS - ESV(CUBED): 30.2 ML
BH CV ECHO MEAS - ESV(MOD-SP4): 25.5 ML
BH CV ECHO MEAS - ESV(TEICH): 38.4 ML
BH CV ECHO MEAS - FS: 34.4 %
BH CV ECHO MEAS - IVS/LVPW: 1
BH CV ECHO MEAS - IVSD: 1.2 CM
BH CV ECHO MEAS - IVSS: 1.2 CM
BH CV ECHO MEAS - LA DIMENSION: 3.2 CM
BH CV ECHO MEAS - LA/AO: 0.9
BH CV ECHO MEAS - LV DIASTOLIC VOL/BSA (35-75): 39.7 ML/M^2
BH CV ECHO MEAS - LV MASS(C)D: 199.9 GRAMS
BH CV ECHO MEAS - LV MASS(C)DI: 110.9 GRAMS/M^2
BH CV ECHO MEAS - LV MASS(C)S: 113.5 GRAMS
BH CV ECHO MEAS - LV MASS(C)SI: 63 GRAMS/M^2
BH CV ECHO MEAS - LV SYSTOLIC VOL/BSA (12-30): 14.1 ML/M^2
BH CV ECHO MEAS - LVIDD: 4.8 CM
BH CV ECHO MEAS - LVIDS: 3.1 CM
BH CV ECHO MEAS - LVLD AP4: 6 CM
BH CV ECHO MEAS - LVLS AP4: 5.1 CM
BH CV ECHO MEAS - LVOT AREA (M): 3.5 CM^2
BH CV ECHO MEAS - LVOT AREA: 3.5 CM^2
BH CV ECHO MEAS - LVOT DIAM: 2.1 CM
BH CV ECHO MEAS - LVPWD: 1.1 CM
BH CV ECHO MEAS - LVPWS: 1.2 CM
BH CV ECHO MEAS - MV A MAX VEL: 77.6 CM/SEC
BH CV ECHO MEAS - MV E MAX VEL: 104 CM/SEC
BH CV ECHO MEAS - MV E/A: 1.3
BH CV ECHO MEAS - PA ACC TIME: 0.07 SEC
BH CV ECHO MEAS - PA PR(ACCEL): 45.7 MMHG
BH CV ECHO MEAS - SI(AO): 177.2 ML/M^2
BH CV ECHO MEAS - SI(CUBED): 42.7 ML/M^2
BH CV ECHO MEAS - SI(MOD-SP4): 25.5 ML/M^2
BH CV ECHO MEAS - SI(TEICH): 36.9 ML/M^2
BH CV ECHO MEAS - SV(AO): 319.4 ML
BH CV ECHO MEAS - SV(CUBED): 76.9 ML
BH CV ECHO MEAS - SV(MOD-SP4): 46 ML
BH CV ECHO MEAS - SV(TEICH): 66.6 ML

## 2021-01-26 PROCEDURE — 93306 TTE W/DOPPLER COMPLETE: CPT | Performed by: INTERNAL MEDICINE

## 2021-01-26 PROCEDURE — 93306 TTE W/DOPPLER COMPLETE: CPT

## 2021-02-02 ENCOUNTER — OFFICE VISIT (OUTPATIENT)
Dept: CARDIOLOGY | Facility: CLINIC | Age: 61
End: 2021-02-02

## 2021-02-02 VITALS
OXYGEN SATURATION: 99 % | HEART RATE: 85 BPM | TEMPERATURE: 96.9 F | SYSTOLIC BLOOD PRESSURE: 118 MMHG | HEIGHT: 64 IN | BODY MASS INDEX: 28.34 KG/M2 | WEIGHT: 166 LBS | DIASTOLIC BLOOD PRESSURE: 58 MMHG

## 2021-02-02 DIAGNOSIS — E78.2 MIXED HYPERLIPIDEMIA: ICD-10-CM

## 2021-02-02 DIAGNOSIS — R00.2 PALPITATIONS: Primary | ICD-10-CM

## 2021-02-02 DIAGNOSIS — F17.210 CIGARETTE NICOTINE DEPENDENCE WITHOUT COMPLICATION: ICD-10-CM

## 2021-02-02 DIAGNOSIS — J43.2 CENTRILOBULAR EMPHYSEMA (HCC): ICD-10-CM

## 2021-02-02 PROCEDURE — 99214 OFFICE O/P EST MOD 30 MIN: CPT | Performed by: INTERNAL MEDICINE

## 2021-02-02 PROCEDURE — 93246 EXT ECG>7D<15D RECORDING: CPT | Performed by: INTERNAL MEDICINE

## 2021-02-02 PROCEDURE — 93000 ELECTROCARDIOGRAM COMPLETE: CPT | Performed by: INTERNAL MEDICINE

## 2021-02-02 RX ORDER — METOPROLOL SUCCINATE 25 MG/1
25 TABLET, EXTENDED RELEASE ORAL DAILY
Qty: 30 TABLET | Refills: 11 | Status: SHIPPED | OUTPATIENT
Start: 2021-02-02 | End: 2021-03-02 | Stop reason: SDUPTHER

## 2021-02-02 NOTE — PROGRESS NOTES
subjective     Chief Complaint   Patient presents with   • Rapid Heart Rate     x 3 weeks     History of Present Illness  Patient is 60 years old white female who complains of rapid heartbeat.  She states her heartbeat is always fast for last 3 weeks but gets a lot worse when she does any physical exertion.  She denies any chest tightness or pressure sensation.  She has COPD and has been taking Trelegy Ellipta inhaler once a day.  She takes albuterol HFA rarely.  She has DuoNeb listed but she does not take DuoNeb treatment according to her.  Despite having COPD she continues to smoke.  She states that it makes her less nervous.    Patient has hypertriglyceridemia and is taking TriCor but she does not have high cholesterol and is does not take any statin therapy  She has a long list of medications.  Past Surgical History:   Procedure Laterality Date   • ABDOMINAL SURGERY     • APPENDECTOMY     • CHOLECYSTECTOMY     • COLONOSCOPY  2014   • ENDOSCOPY  2014   • LUNG SURGERY      biopsy   • MOUTH SURGERY      dental extractions   • SKIN BIOPSY     • TOTAL LAPAROSCOPIC HYSTERECTOMY N/A 1/16/2018    Procedure: ROBOTIC TOTAL LAPAROSCOPIC HYSTERECTOMY WITH BILATERAL SALPINGO OOPHORECTOMY;  Surgeon: Germain Topete MD;  Location: [facility];  Service:      Family History   Problem Relation Age of Onset   • Coronary artery disease Mother    • Heart attack Mother    • Diabetes Mother    • Heart disease Other    • Lung cancer Other    • Heart disease Father    • Hypertension Father    • Cancer Father    • No Known Problems Sister    • Diabetes Brother    • No Known Problems Sister    • No Known Problems Sister    • No Known Problems Sister    • Arthritis Brother      Past Medical History:   Diagnosis Date   • Ankle edema    • Anxiety and depression    • Arthritis    • Asthmatic bronchitis    • Atypical chest pain    • COPD (chronic obstructive pulmonary disease) (CMS/[facility])    • Family history of premature coronary artery disease     • GERD (gastroesophageal reflux disease)    • Hiatal hernia    • History of EKG 09/29/2015    NORMAL   • Hyperlipidemia    • Hypertension    • Lung nodules    • Osteoarthritis    • Ovarian mass, left    • Ovarian tumor (benign), left 1/16/2018   • Recent weight loss    • Seasonal allergies    • Tobacco abuse    • Wears dentures     top plate     Patient Active Problem List   Diagnosis   • Hyperlipidemia   • GERD (gastroesophageal reflux disease)   • Tobacco use   • Obesity   • COPD (chronic obstructive pulmonary disease) (CMS/HCC)   • Atypical chest pain   • Palpitations   • Ovarian tumor (benign), left   • Cigarette nicotine dependence without complication   • Fatigue   • Multiple pulmonary nodules       Social History     Tobacco Use   • Smoking status: Current Every Day Smoker     Packs/day: 1.50     Years: 37.00     Pack years: 55.50     Types: Cigarettes   • Smokeless tobacco: Never Used   Substance Use Topics   • Alcohol use: No   • Drug use: No       No Known Allergies    Current Outpatient Medications on File Prior to Visit   Medication Sig   • Adult Aspirin Regimen 81 MG EC tablet    • albuterol sulfate  (90 Base) MCG/ACT inhaler Inhale 2 puffs Every 4 (Four) Hours As Needed for Wheezing or Shortness of Air.   • alendronate (FOSAMAX) 70 MG tablet Take 1 tablet by mouth 1 (One) Time Per Week. Prior to [facility] Admission, Patient was on: takes on mondays   • busPIRone (BUSPAR) 15 MG tablet    • celecoxib (CeleBREX) 100 MG capsule    • diclofenac (VOLTAREN) 3 % gel gel    • escitalopram (LEXAPRO) 20 MG tablet Take 20 mg by mouth Daily.   • estradiol (ESTRACE) 2 MG tablet Take 1 tablet by mouth Daily.   • fenofibrate (TRICOR) 145 MG tablet Take 145 mg by mouth Daily.   • fluticasone (FLONASE) 50 MCG/ACT nasal spray into each nostril.   • Fluticasone-Umeclidin-Vilant (Trelegy Ellipta) 100-62.5-25 MCG/INH aerosol powder  Inhale 1 puff Daily.   • folic acid (FOLVITE) 1 MG tablet Take 1 mg by mouth Daily.    • furosemide (LASIX) 20 MG tablet Take 20 mg by mouth Daily.   • gabapentin (NEURONTIN) 600 MG tablet Take 600 mg by mouth 3 (Three) Times a Day.   • HYDROcodone-acetaminophen (NORCO)  MG per tablet Take 1 tablet by mouth 3 (three) times a day.   • hydroxychloroquine (PLAQUENIL) 200 MG tablet    • ipratropium-albuterol (DUO-NEB) 0.5-2.5 mg/3 ml nebulizer Take 3 mL by nebulization 4 (Four) Times a Day As Needed for Wheezing or Shortness of Air.   • lactulose (CHRONULAC) 10 GM/15ML solution    • levocetirizine (XYZAL) 5 MG tablet    • LINZESS 290 MCG capsule capsule    • melatonin 3 MG tablet    • metoclopramide (REGLAN) 10 MG tablet Take 10 mg by mouth Daily.   • montelukast (SINGULAIR) 10 MG tablet Take 1 tablet by mouth Every Night.   • nystatin (MYCOSTATIN) 404016 UNIT/ML suspension    • pantoprazole (PROTONIX) 40 MG EC tablet Take 40 mg by mouth 2 (Two) Times a Day.   • QUEtiapine (SEROquel) 25 MG tablet    • Stool Softener 100 MG capsule    • vitamin C (ASCORBIC ACID) 250 MG tablet Take 250 mg by mouth Daily.   • vitamin D (ERGOCALCIFEROL) 35168 units capsule capsule Take 50,000 Units by mouth Every 30 (Thirty) Days.   • vitamin E 1000 UNIT capsule    • zolpidem (AMBIEN) 5 MG tablet    • [DISCONTINUED] busPIRone (BUSPAR) 5 MG tablet    • [DISCONTINUED] diazepam (VALIUM) 5 MG tablet Take 5 mg by mouth 3 (three) times a day.   • [DISCONTINUED] Erythromycin 333 MG EC tablet      No current facility-administered medications on file prior to visit.          The following portions of the patient's history were reviewed and updated as appropriate: allergies, current medications, past family history, past medical history, past social history, past surgical history and problem list.    Review of Systems   Constitution: Negative.   HENT: Negative.  Negative for congestion.    Eyes: Negative.    Cardiovascular: Positive for dyspnea on exertion and palpitations. Negative for chest pain, cyanosis, irregular  "heartbeat, leg swelling, near-syncope, orthopnea, paroxysmal nocturnal dyspnea and syncope.   Respiratory: Positive for shortness of breath.    Hematologic/Lymphatic: Negative.    Musculoskeletal: Negative.    Gastrointestinal: Negative.    Neurological: Negative.  Negative for headaches.          Objective:     /58 (BP Location: Left arm, Patient Position: Sitting, Cuff Size: Adult)   Pulse 85   Temp 96.9 °F (36.1 °C)   Ht 162.6 cm (64\")   Wt 75.3 kg (166 lb)   LMP  (LMP Unknown)   SpO2 99%   BMI 28.49 kg/m²   Vitals signs and nursing note reviewed.   Constitutional:       General: Not in acute distress.     Appearance: Healthy appearance. Well-developed and not in distress. Not diaphoretic.   Eyes:      General: No scleral icterus.     Conjunctiva/sclera: Conjunctivae normal.      Pupils: Pupils are equal, round, and reactive to light.   HENT:      Head: Normocephalic and atraumatic.   Neck:      Musculoskeletal: Normal range of motion and neck supple.      Thyroid: Thyroid normal. No thyromegaly.      Vascular: No JVD. JVD normal.      Trachea: No tracheal deviation.      Lymphadenopathy: No cervical adenopathy.   Pulmonary:      Effort: Pulmonary effort is normal. No respiratory distress.      Breath sounds: Normal breath sounds and air entry. No wheezing. No rhonchi. No rales.   Chest:      Chest wall: Not tender to palpatation.   Cardiovascular:      PMI at left midclavicular line. Normal rate. Regular rhythm.      No gallop.   Pulses:     Intact distal pulses. No decreased pulses.   Abdominal:      General: Bowel sounds are normal. There is no distension or abdominal bruit.      Palpations: Abdomen is soft. There is no hepatomegaly, splenomegaly or abdominal mass.      Tenderness: There is no abdominal tenderness. There is no guarding or rebound.   Skin:     General: Skin is warm and dry. There is no cyanosis.      Coloration: Skin is not jaundiced or pale.      Findings: No erythema or rash. "   Neurological:      Mental Status: Alert, oriented to person, place, and time and oriented to person, place and time.   Psychiatric:         Attention and Perception: Attention normal.         Mood and Affect: Mood and affect normal.         Speech: Speech normal.         Behavior: Behavior is cooperative.           Lab Review  Lab Results   Component Value Date     01/11/2018    K 4.1 01/11/2018     01/11/2018    BUN 6 (L) 01/11/2018    CREATININE 0.79 01/11/2018    GLUCOSE 80 01/11/2018    CALCIUM 9.0 01/11/2018    ALT 24 02/20/2014    ALKPHOS 68 02/20/2014    LABIL2 1.6 02/20/2014     No results found for: CKTOTAL  Lab Results   Component Value Date    WBC 15.79 (H) 01/17/2018    HGB 12.4 01/17/2018    HCT 39.1 01/17/2018     01/17/2018     No results found for: INR  No results found for: MG  No results found for: PSA, TSH  No results found for: BNP  Lab Results   Component Value Date    CHLPL 165 02/20/2014    TRIG 352 (H) 02/20/2014    HDL 32 (L) 02/20/2014    VLDL 70 (H) 02/20/2014     Lab Results   Component Value Date    LDL 62 02/20/2014         ECG 12 Lead    Date/Time: 2/2/2021 4:46 PM  Performed by: Zia Coelho MD  Authorized by: Zia Coelho MD   Comparison: compared with previous ECG from 12/14/2018  Similar to previous ECG  Rhythm: sinus rhythm  Rate: normal  BPM: 85  Conduction: conduction normal  ST Segments: ST segments normal  T Waves: T waves normal  QRS axis: normal    Clinical impression: normal ECG          Interpretation Summary echocardiogram January 26, 2021    · Normal left ventricular cavity size and wall thickness noted.  · Left ventricular ejection fraction appears to be 61 - 65%. Left ventricular systolic function is normal.  · Left ventricular diastolic function was normal.  · No significant valvular heart disease  · Mild Lipomatous hypertrophy of the interatrial septum present.  · There is no evidence of pericardial effusion.          I  personally viewed and interpreted the patient's LAB data         Assessment:     1. Palpitations    2. Cigarette nicotine dependence without complication    3. Centrilobular emphysema (CMS/HCC)    4. Mixed hyperlipidemia          Plan:     Patient complains of palpitations and fast heartbeat.  EKG at this time does not show any tachycardia, EKG is normal.    Her tachycardia and fast heartbeat is probably related to her breathing treatments however she is insistent that her heart beats very fast and wants something done about it.    She was advised to stop smoking but she insists that she cannot stop because it keeps her nerves calmed.    Echocardiogram and EKG reports were discussed with the patient.    Holter monitor will be checked for 2 weeks.  She was started on low-dose Toprol and see if she can tolerate that.  Side effects of Toprol especially with COPD was discussed and explained to the patient.  She will start Toprol-XL 12.5 mg daily.  If she has any worsening of respiratory symptoms she will discontinue it.  Her PFT did not show any obstruction and there was no significant bronchodilator response    Later on she will need a stress test to see the heart rate response to exercise  Follow-up scheduled      No follow-ups on file.

## 2021-03-02 ENCOUNTER — TREATMENT (OUTPATIENT)
Dept: CARDIOLOGY | Facility: CLINIC | Age: 61
End: 2021-03-02

## 2021-03-02 ENCOUNTER — OFFICE VISIT (OUTPATIENT)
Dept: CARDIOLOGY | Facility: CLINIC | Age: 61
End: 2021-03-02

## 2021-03-02 VITALS
OXYGEN SATURATION: 99 % | HEIGHT: 64 IN | BODY MASS INDEX: 28.68 KG/M2 | HEART RATE: 70 BPM | WEIGHT: 168 LBS | TEMPERATURE: 98.4 F | SYSTOLIC BLOOD PRESSURE: 110 MMHG | DIASTOLIC BLOOD PRESSURE: 70 MMHG

## 2021-03-02 DIAGNOSIS — R00.2 PALPITATIONS: ICD-10-CM

## 2021-03-02 DIAGNOSIS — R07.9 CHEST PAIN IN ADULT: ICD-10-CM

## 2021-03-02 DIAGNOSIS — E78.2 MIXED HYPERLIPIDEMIA: Primary | ICD-10-CM

## 2021-03-02 DIAGNOSIS — Z72.0 TOBACCO USE: ICD-10-CM

## 2021-03-02 PROCEDURE — 93248 EXT ECG>7D<15D REV&INTERPJ: CPT | Performed by: INTERNAL MEDICINE

## 2021-03-02 PROCEDURE — 99214 OFFICE O/P EST MOD 30 MIN: CPT | Performed by: INTERNAL MEDICINE

## 2021-03-02 RX ORDER — AMOXICILLIN AND CLAVULANATE POTASSIUM 875; 125 MG/1; MG/1
TABLET, FILM COATED ORAL
COMMUNITY
Start: 2021-02-09 | End: 2022-01-13

## 2021-03-02 RX ORDER — CILOSTAZOL 50 MG/1
50 TABLET ORAL DAILY
COMMUNITY
Start: 2021-02-09

## 2021-03-02 RX ORDER — FAMOTIDINE 20 MG/1
TABLET, FILM COATED ORAL
COMMUNITY
Start: 2021-02-09

## 2021-03-02 RX ORDER — ROPINIROLE 0.5 MG/1
0.5 TABLET, FILM COATED ORAL DAILY
COMMUNITY
Start: 2021-02-09

## 2021-03-02 RX ORDER — METOPROLOL SUCCINATE 50 MG/1
50 TABLET, EXTENDED RELEASE ORAL DAILY
Qty: 90 TABLET | Refills: 0 | Status: SHIPPED | OUTPATIENT
Start: 2021-03-02 | End: 2022-01-13 | Stop reason: SDUPTHER

## 2021-03-02 NOTE — PROGRESS NOTES
subjective     Chief Complaint   Patient presents with   • Results     Event Monitor   • Palpitations     pt continues to have, states new rx (metoprolol)no change     History of Present Illness  Patient is 61 years old white female who is here for cardiology follow-up.  She complains of lot of palpitations.  She had a 2 weeks Holter monitor which will be reviewed today.    She also complains of chest tightness and pressure sensation with numbness in the left arm.  She is always short of breath.  She has COPD and still uses tobacco also.    Past Surgical History:   Procedure Laterality Date   • ABDOMINAL SURGERY     • APPENDECTOMY     • CHOLECYSTECTOMY     • COLONOSCOPY  2014   • ENDOSCOPY  2014   • LUNG SURGERY      biopsy   • MOUTH SURGERY      dental extractions   • SKIN BIOPSY     • TOTAL LAPAROSCOPIC HYSTERECTOMY N/A 1/16/2018    Procedure: ROBOTIC TOTAL LAPAROSCOPIC HYSTERECTOMY WITH BILATERAL SALPINGO OOPHORECTOMY;  Surgeon: Geramin Topete MD;  Location: [facility];  Service:      Family History   Problem Relation Age of Onset   • Coronary artery disease Mother    • Heart attack Mother    • Diabetes Mother    • Heart disease Other    • Lung cancer Other    • Heart disease Father    • Hypertension Father    • Cancer Father    • No Known Problems Sister    • Diabetes Brother    • No Known Problems Sister    • No Known Problems Sister    • No Known Problems Sister    • Arthritis Brother      Past Medical History:   Diagnosis Date   • Ankle edema    • Anxiety and depression    • Arthritis    • Asthmatic bronchitis    • Atypical chest pain    • COPD (chronic obstructive pulmonary disease) (CMS/HCC)    • Family history of premature coronary artery disease    • GERD (gastroesophageal reflux disease)    • Hiatal hernia    • History of EKG 09/29/2015    NORMAL   • Hyperlipidemia    • Hypertension    • Lung nodules    • Osteoarthritis    • Ovarian mass, left    • Ovarian tumor (benign), left 1/16/2018   • Recent  weight loss    • Seasonal allergies    • Tobacco abuse    • Wears dentures     top plate     Patient Active Problem List   Diagnosis   • Hyperlipidemia   • GERD (gastroesophageal reflux disease)   • Tobacco use   • Obesity   • COPD (chronic obstructive pulmonary disease) (CMS/HCC)   • Chest pain in adult   • Palpitations   • Ovarian tumor (benign), left   • Cigarette nicotine dependence without complication   • Fatigue   • Multiple pulmonary nodules       Social History     Tobacco Use   • Smoking status: Current Every Day Smoker     Packs/day: 1.50     Years: 37.00     Pack years: 55.50     Types: Cigarettes   • Smokeless tobacco: Never Used   Substance Use Topics   • Alcohol use: No   • Drug use: No       No Known Allergies    Current Outpatient Medications on File Prior to Visit   Medication Sig   • Adult Aspirin Regimen 81 MG EC tablet    • albuterol sulfate  (90 Base) MCG/ACT inhaler Inhale 2 puffs Every 4 (Four) Hours As Needed for Wheezing or Shortness of Air.   • alendronate (FOSAMAX) 70 MG tablet Take 1 tablet by mouth 1 (One) Time Per Week. Prior to [facility] Admission, Patient was on: takes on mondays   • amoxicillin-clavulanate (AUGMENTIN) 875-125 MG per tablet    • busPIRone (BUSPAR) 15 MG tablet    • cilostazol (PLETAL) 50 MG tablet Take 50 mg by mouth Daily.   • diclofenac (VOLTAREN) 3 % gel gel    • diclofenac (VOLTAREN) 50 MG EC tablet Take 50 mg by mouth 2 (two) times a day.   • escitalopram (LEXAPRO) 20 MG tablet Take 20 mg by mouth Daily.   • estradiol (ESTRACE) 2 MG tablet Take 1 tablet by mouth Daily.   • famotidine (PEPCID) 20 MG tablet    • fenofibrate (TRICOR) 145 MG tablet Take 145 mg by mouth Daily.   • fluticasone (FLONASE) 50 MCG/ACT nasal spray into each nostril.   • Fluticasone-Umeclidin-Vilant (Trelegy Ellipta) 100-62.5-25 MCG/INH aerosol powder  Inhale 1 puff Daily.   • folic acid (FOLVITE) 1 MG tablet Take 1 mg by mouth Daily.   • furosemide (LASIX) 20 MG tablet Take 20 mg by  mouth Daily.   • gabapentin (NEURONTIN) 600 MG tablet Take 600 mg by mouth 3 (Three) Times a Day.   • HYDROcodone-acetaminophen (NORCO)  MG per tablet Take 1 tablet by mouth 3 (three) times a day.   • hydroxychloroquine (PLAQUENIL) 200 MG tablet    • ipratropium-albuterol (DUO-NEB) 0.5-2.5 mg/3 ml nebulizer Take 3 mL by nebulization 4 (Four) Times a Day As Needed for Wheezing or Shortness of Air.   • lactulose (CHRONULAC) 10 GM/15ML solution    • levocetirizine (XYZAL) 5 MG tablet    • LINZESS 290 MCG capsule capsule    • melatonin 3 MG tablet    • metoclopramide (REGLAN) 10 MG tablet Take 10 mg by mouth Daily.   • montelukast (SINGULAIR) 10 MG tablet Take 1 tablet by mouth Every Night.   • nystatin (MYCOSTATIN) 498240 UNIT/ML suspension    • pantoprazole (PROTONIX) 40 MG EC tablet Take 40 mg by mouth 2 (Two) Times a Day.   • QUEtiapine (SEROquel) 25 MG tablet    • rOPINIRole (REQUIP) 0.5 MG tablet Take 0.5 mg by mouth Daily.   • Stool Softener 100 MG capsule    • vitamin C (ASCORBIC ACID) 250 MG tablet Take 250 mg by mouth Daily.   • vitamin D (ERGOCALCIFEROL) 41831 units capsule capsule Take 50,000 Units by mouth Every 14 (Fourteen) Days.   • vitamin E 1000 UNIT capsule    • zolpidem (AMBIEN) 5 MG tablet    • [DISCONTINUED] celecoxib (CeleBREX) 100 MG capsule    • [DISCONTINUED] metoprolol succinate XL (TOPROL-XL) 25 MG 24 hr tablet Take 1 tablet by mouth Daily.     No current facility-administered medications on file prior to visit.          The following portions of the patient's history were reviewed and updated as appropriate: allergies, current medications, past family history, past medical history, past social history, past surgical history and problem list.    Review of Systems   Constitution: Negative.   HENT: Negative.  Negative for congestion.    Eyes: Negative.    Cardiovascular: Positive for chest pain and palpitations. Negative for cyanosis, dyspnea on exertion, irregular heartbeat, leg  "swelling, near-syncope, orthopnea, paroxysmal nocturnal dyspnea and syncope.   Respiratory: Positive for shortness of breath.    Hematologic/Lymphatic: Negative.    Musculoskeletal: Negative.    Gastrointestinal: Negative.    Neurological: Negative.  Negative for headaches.          Objective:     /70 (BP Location: Left arm, Patient Position: Sitting, Cuff Size: Adult)   Pulse 70   Temp 98.4 °F (36.9 °C)   Ht 162.6 cm (64\")   Wt 76.2 kg (168 lb)   LMP  (LMP Unknown)   SpO2 99%   BMI 28.84 kg/m²   Vitals signs and nursing note reviewed.   Cardiovascular:      PMI at left midclavicular line. Normal rate. Regular rhythm. Normal S1. Normal S2.      Murmurs: There is no murmur.      No gallop. No click. No rub.   Pulses:     Intact distal pulses.   Edema:     Peripheral edema absent.           Lab Review  Lab Results   Component Value Date     01/11/2018    K 4.1 01/11/2018     01/11/2018    BUN 6 (L) 01/11/2018    CREATININE 0.79 01/11/2018    GLUCOSE 80 01/11/2018    CALCIUM 9.0 01/11/2018    ALT 24 02/20/2014    ALKPHOS 68 02/20/2014    LABIL2 1.6 02/20/2014     No results found for: CKTOTAL  Lab Results   Component Value Date    WBC 15.79 (H) 01/17/2018    HGB 12.4 01/17/2018    HCT 39.1 01/17/2018     01/17/2018     No results found for: INR  No results found for: MG  No results found for: PSA, TSH  No results found for: BNP  Lab Results   Component Value Date    CHLPL 165 02/20/2014    TRIG 352 (H) 02/20/2014    HDL 32 (L) 02/20/2014    VLDL 70 (H) 02/20/2014     Lab Results   Component Value Date    LDL 62 02/20/2014       Procedures       I personally viewed and interpreted the patient's LAB data         Assessment:     1. Mixed hyperlipidemia    2. Palpitations    3. Chest pain in adult    4. Tobacco use          Plan:     Patient complains of palpitations which probably gets worse with her breathing treatments.  She is tolerating Toprol-XL 25 very well, will increase it to 50 mg " daily.    She also complains of chest tightness and pressure sensation, her last stress test was 7 years ago.  A stress test will be arranged.    Cessation of tobacco use was strongly urged.  Weight loss and healthy lifestyle discussed.  Follow-up scheduled        No follow-ups on file.

## 2021-03-16 ENCOUNTER — OFFICE VISIT (OUTPATIENT)
Dept: PULMONOLOGY | Facility: CLINIC | Age: 61
End: 2021-03-16

## 2021-03-16 ENCOUNTER — TELEPHONE (OUTPATIENT)
Dept: CARDIOLOGY | Facility: CLINIC | Age: 61
End: 2021-03-16

## 2021-03-16 VITALS
SYSTOLIC BLOOD PRESSURE: 96 MMHG | HEIGHT: 64 IN | HEART RATE: 82 BPM | OXYGEN SATURATION: 97 % | DIASTOLIC BLOOD PRESSURE: 38 MMHG | TEMPERATURE: 98.2 F | BODY MASS INDEX: 28.34 KG/M2 | WEIGHT: 166 LBS

## 2021-03-16 DIAGNOSIS — R91.1 PULMONARY NODULE: Primary | ICD-10-CM

## 2021-03-16 DIAGNOSIS — F17.200 CURRENT SMOKER: ICD-10-CM

## 2021-03-16 DIAGNOSIS — J44.9 CHRONIC OBSTRUCTIVE PULMONARY DISEASE, UNSPECIFIED COPD TYPE (HCC): ICD-10-CM

## 2021-03-16 PROCEDURE — 99212 OFFICE O/P EST SF 10 MIN: CPT | Performed by: INTERNAL MEDICINE

## 2021-03-16 NOTE — PROGRESS NOTES
Chief complaint:   Chief Complaint   Patient presents with   • Emphysema       History of present illness:   Ms. Mondragon is a very pleasant 61-year-old female with a past medical history of COPD on albuterol inhaler, trilogy inhaler and as needed duo nebs.  She is an active smoker.  The recent CT scan of the chest at outside hospital showed right upper lobe pulmonary nodule.  She denies any worsening of shortness of breath at rest or on exertion.  She denies any fever or chills.  She denies any hemoptysis.  She denies any wheezing or cough.  She denies any chest pain or racing of her heart    Review of Systems:   General ROS: negative for chills, fatigue or fever  Psychological ROS: negative for anxiety or depression  ENT ROS: negative for headaches, visual changes or vocal changes  Respiratory ROS: Negative for cough.  Stable for shortness of breath  Cardiovascular ROS: Negative for chest pain.  Stable for dyspnea on exertion  Gastrointestinal ROS: no abdominal pain, change in bowel habits, or black or bloody stools  Musculoskeletal ROS: negative for joint pain, joint stiffness or joint swelling  Neurological ROS: no TIA or stroke symptoms  Heme- no bleeding, no lumps and bumps in armpit  Skin- no bruises, no rash    Past medical history, past surgical history, social history and family history:  •  has a past medical history of Ankle edema, Anxiety and depression, Arthritis, Asthmatic bronchitis, Atypical chest pain, COPD (chronic obstructive pulmonary disease) (CMS/[facility]), Family history of premature coronary artery disease, GERD (gastroesophageal reflux disease), Hiatal hernia, History of EKG (09/29/2015), Hyperlipidemia, Hypertension, Lung nodules, Osteoarthritis, Ovarian mass, left, Ovarian tumor (benign), left (1/16/2018), Recent weight loss, Seasonal allergies, Tobacco abuse, and Wears dentures.  •  has a past surgical history that includes Lung surgery; Cholecystectomy; Colonoscopy (2014);  Esophagogastroduodenoscopy (2014); Abdominal surgery; Mouth surgery; Skin biopsy; total laparoscopic hysterectomy (N/A, 1/16/2018); and Appendectomy.  •  reports that she has been smoking cigarettes. She has been smoking about 1.50 packs per day. She has never used smokeless tobacco. She reports that she does not drink alcohol and does not use drugs.  • family history includes Arthritis in her brother; Cancer in her father; Coronary artery disease in her mother; Diabetes in her brother and mother; Heart attack in her mother; Heart disease in her father and another family member; Hypertension in her father; Lung cancer in an other family member; No Known Problems in her sister, sister, sister, and sister.     Medication and allergies:  • Active medication:  Current Outpatient Medications on File Prior to Visit   Medication Sig   • Adult Aspirin Regimen 81 MG EC tablet    • albuterol sulfate  (90 Base) MCG/ACT inhaler Inhale 2 puffs Every 4 (Four) Hours As Needed for Wheezing or Shortness of Air.   • alendronate (FOSAMAX) 70 MG tablet Take 1 tablet by mouth 1 (One) Time Per Week. Prior to [facility] Admission, Patient was on: takes on mondays   • amoxicillin-clavulanate (AUGMENTIN) 875-125 MG per tablet    • busPIRone (BUSPAR) 15 MG tablet    • cilostazol (PLETAL) 50 MG tablet Take 50 mg by mouth Daily.   • diclofenac (VOLTAREN) 3 % gel gel    • diclofenac (VOLTAREN) 50 MG EC tablet Take 50 mg by mouth 2 (two) times a day.   • escitalopram (LEXAPRO) 20 MG tablet Take 20 mg by mouth Daily.   • estradiol (ESTRACE) 2 MG tablet Take 1 tablet by mouth Daily.   • famotidine (PEPCID) 20 MG tablet    • fenofibrate (TRICOR) 145 MG tablet Take 145 mg by mouth Daily.   • fluticasone (FLONASE) 50 MCG/ACT nasal spray into each nostril.   • Fluticasone-Umeclidin-Vilant (Trelegy Ellipta) 100-62.5-25 MCG/INH aerosol powder  Inhale 1 puff Daily.   • folic acid (FOLVITE) 1 MG tablet Take 1 mg by mouth Daily.   • furosemide (LASIX)  "20 MG tablet Take 20 mg by mouth Daily.   • gabapentin (NEURONTIN) 600 MG tablet Take 600 mg by mouth 3 (Three) Times a Day.   • HYDROcodone-acetaminophen (NORCO)  MG per tablet Take 1 tablet by mouth 3 (three) times a day.   • hydroxychloroquine (PLAQUENIL) 200 MG tablet    • ipratropium-albuterol (DUO-NEB) 0.5-2.5 mg/3 ml nebulizer Take 3 mL by nebulization 4 (Four) Times a Day As Needed for Wheezing or Shortness of Air.   • lactulose (CHRONULAC) 10 GM/15ML solution    • levocetirizine (XYZAL) 5 MG tablet    • LINZESS 290 MCG capsule capsule    • melatonin 3 MG tablet    • metoclopramide (REGLAN) 10 MG tablet Take 10 mg by mouth Daily.   • metoprolol succinate XL (TOPROL-XL) 50 MG 24 hr tablet Take 1 tablet by mouth Daily.   • montelukast (SINGULAIR) 10 MG tablet Take 1 tablet by mouth Every Night.   • nystatin (MYCOSTATIN) 184877 UNIT/ML suspension    • pantoprazole (PROTONIX) 40 MG EC tablet Take 40 mg by mouth 2 (Two) Times a Day.   • QUEtiapine (SEROquel) 25 MG tablet    • rOPINIRole (REQUIP) 0.5 MG tablet Take 0.5 mg by mouth Daily.   • Stool Softener 100 MG capsule    • vitamin C (ASCORBIC ACID) 250 MG tablet Take 250 mg by mouth Daily.   • vitamin D (ERGOCALCIFEROL) 12739 units capsule capsule Take 50,000 Units by mouth Every 14 (Fourteen) Days.   • vitamin E 1000 UNIT capsule    • zolpidem (AMBIEN) 5 MG tablet      No current facility-administered medications on file prior to visit.        Allergies:    Patient has no known allergies.      Vital Signs    height is 162.6 cm (64\") and weight is 75.3 kg (166 lb). Her temporal temperature is 98.2 °F (36.8 °C). Her blood pressure is 96/38 (abnormal) and her pulse is 82. Her oxygen saturation is 97%.      Physical Exam:  Constitutional:  oriented to person, place, and time. No distress.   Head: Normocephalic and atraumatic.   Right Ear and left Ear : External ear normal.   Nose: Nose normal.   Eyes: Pupils are equal, round, and reactive to light.  No " scleral icterus.   Neck: Normal range of motion. Neck supple.    Cardiovascular: Normal rate, regular rhythm, normal heart sounds. No murmur heard.  Pulmonary: Bilateral air entry equal.  No wheezing.  No rhonchi.  Abdominal: Soft. Bowel sounds are normal. There is no tenderness.   Musculoskeletal: Normal range of motion. Exhibits no deformity.   Neurological: alert and oriented to person, place, and time. No cranial nerve deficit. Coordination normal.   Skin: Skin is warm and dry. No erythema.   Psychiatric:Normal mood and affect.   behavior is normal.     Result review:     CT scan from outside hospital was difficult to run on our system.  Images crashed after few slides.  I was able to take a picture of new right upper lobe nodule.  •       Assessment and plan:   Diagnosis Plan   1. Pulmonary nodule  Ambulatory Referral to Cardiothoracic Surgery    She is an active smoker.  Considering the characteristics of a patient and characteristics of the nodule, her risk of nodule being malignant is high.  The location of nodule is peripheral.  High risk for false negative biopsy results on bronchoscopy.  I discussed the differential diagnosis of pulmonary nodule with the patient.  I explained her that her smoking puts her at a higher risk of cancer.  Due to location of the nodule, I have involved cardiothoracic surgery for further opinion.  I discussed my plan with the patient and she agrees with obtaining the consultation of cardiothoracic surgery.   2. Chronic obstructive pulmonary disease, unspecified COPD type (CMS/HCC)   on albuterol inhaler, Trelegy inhaler and as needed DuoNeb   3. Current smoker   smoking cessation counseling performed       Follow up in 3 months and as needed.       Thank you for involving us in the care of the patient.  Maikel Edouard MD  Pulmonary and Critical Care Medicine

## 2021-03-16 NOTE — TELEPHONE ENCOUNTER
Patient states she went to the lung doctor today and her BP was 96/38 HR 82 she is still taking the toprol XL 25mg qd she has not increased it yet she wants to know what she should do.

## 2021-03-16 NOTE — TELEPHONE ENCOUNTER
Called patient and dtold her to cut the toprol XL 25mg in half and keep a check on her blood pressure and heart rate and let us know what it is running, per Dr Brito note. Patient voiced understanding

## 2021-04-05 DIAGNOSIS — Z00.6 EXAMINATION FOR NORMAL COMPARISON FOR CLINICAL RESEARCH: Primary | ICD-10-CM

## 2021-04-14 ENCOUNTER — OFFICE VISIT (OUTPATIENT)
Dept: CARDIAC SURGERY | Facility: CLINIC | Age: 61
End: 2021-04-14

## 2021-04-14 VITALS
BODY MASS INDEX: 25.07 KG/M2 | WEIGHT: 156 LBS | TEMPERATURE: 97.7 F | OXYGEN SATURATION: 98 % | HEIGHT: 66 IN | SYSTOLIC BLOOD PRESSURE: 145 MMHG | DIASTOLIC BLOOD PRESSURE: 58 MMHG | HEART RATE: 85 BPM

## 2021-04-14 DIAGNOSIS — R91.8 MULTIPLE PULMONARY NODULES: Primary | ICD-10-CM

## 2021-04-14 PROCEDURE — 99203 OFFICE O/P NEW LOW 30 MIN: CPT | Performed by: THORACIC SURGERY (CARDIOTHORACIC VASCULAR SURGERY)

## 2021-04-14 NOTE — PROGRESS NOTES
CERTIFICATE OF RETURN TO WORK      January 30, 2019      Re: [facility] Damon  [address] [place] [address]        This is to certify that [facility] is scheduled for surgery on 2/4/2019. Please excuse patient from work 2/4/2019-2/6/2019.                  SIGNATURE:___________________________________________,   1/30/2019                                                           Dr. Ricki Moya  [facility]  [address]  [address]  [place] [address]  [phone]                                                        04/14/2021  Patient Information  Alanna Concepcion                                                                                          [id number]   [place] [address]   1960  'PCP/Referring Physician'  Darby Rosario MD  [phone]  No ref. provider found    Chief Complaint   Patient presents with   • Consult     Np for pulmonary nodule per Dr. Edouard. Pt stated she is SOB with exertion.          History of Present Illness: 61-year-old  female with a history of hypertension, hyperlipidemia, COPD and active tobacco abuse who presents with pulmonary nodules.  The patient ultimately presented 2 months ago due to chest pain.  She describes a pressure-like sensation in the substernal chest that radiates to the left arm.  This chest pain is exacerbated with mowing the yard and alleviated with rest.  She has associated diaphoresis.  This ultimately prompted evaluation at [facility] where a CT scan was performed that demonstrated lung nodules.  The patient has a chronic cough with clear sputum production.  She denies any hemoptysis, weight loss, lymphadenopathy, fevers or chills.      Patient Active Problem List   Diagnosis   • Hyperlipidemia   • GERD (gastroesophageal reflux disease)   • Tobacco use   • Obesity   • COPD (chronic obstructive pulmonary disease) (CMS/HCC)   • Chest pain in adult   • Palpitations   • Ovarian tumor (benign), left   • Cigarette nicotine dependence without complication   • Fatigue   • Multiple pulmonary nodules     Past Medical History:   Diagnosis Date   • Ankle edema    • Anxiety and depression    • Arthritis    • Asthmatic bronchitis    • Atypical chest pain    • COPD (chronic obstructive pulmonary disease) (CMS/HCC)    • Family history of premature coronary artery disease    • GERD (gastroesophageal reflux disease)    • Hiatal hernia    • History of EKG 09/29/2015    NORMAL   • Hyperlipidemia    • Hypertension    • Lung nodules    • Osteoarthritis     • Ovarian mass, left    • Ovarian tumor (benign), left 1/16/2018   • Pulmonary nodule    • Recent weight loss    • Seasonal allergies    • Tobacco abuse    • Wears dentures     top plate     Past Surgical History:   Procedure Laterality Date   • ABDOMINAL SURGERY     • APPENDECTOMY     • CHOLECYSTECTOMY     • COLONOSCOPY  2014   • ENDOSCOPY  2014   • LUNG SURGERY      biopsy   • MOUTH SURGERY      dental extractions   • SKIN BIOPSY     • TOTAL LAPAROSCOPIC HYSTERECTOMY N/A 1/16/2018    Procedure: ROBOTIC TOTAL LAPAROSCOPIC HYSTERECTOMY WITH BILATERAL SALPINGO OOPHORECTOMY;  Surgeon: Germain Toepte MD;  Location: [facility];  Service:        Current Outpatient Medications:   •  Adult Aspirin Regimen 81 MG EC tablet, , Disp: , Rfl:   •  albuterol sulfate  (90 Base) MCG/ACT inhaler, Inhale 2 puffs Every 4 (Four) Hours As Needed for Wheezing or Shortness of Air., Disp: 18 g, Rfl: 8  •  alendronate (FOSAMAX) 70 MG tablet, Take 1 tablet by mouth 1 (One) Time Per Week. Prior to [facility], Patient was on: takes on mondays, Disp: , Rfl:   •  amoxicillin-clavulanate (AUGMENTIN) 875-125 MG per tablet, , Disp: , Rfl:   •  busPIRone (BUSPAR) 15 MG tablet, , Disp: , Rfl:   •  cilostazol (PLETAL) 50 MG tablet, Take 50 mg by mouth Daily., Disp: , Rfl:   •  diclofenac (VOLTAREN) 3 % gel gel, , Disp: , Rfl:   •  diclofenac (VOLTAREN) 50 MG EC tablet, Take 50 mg by mouth 2 (two) times a day., Disp: , Rfl:   •  escitalopram (LEXAPRO) 20 MG tablet, Take 20 mg by mouth Daily., Disp: , Rfl:   •  estradiol (ESTRACE) 2 MG tablet, Take 1 tablet by mouth Daily., Disp: 100 tablet, Rfl: 3  •  famotidine (PEPCID) 20 MG tablet, , Disp: , Rfl:   •  fenofibrate (TRICOR) 145 MG tablet, Take 145 mg by mouth Daily., Disp: , Rfl:   •  fluticasone (FLONASE) 50 MCG/ACT nasal spray, into each nostril., Disp: , Rfl:   •  Fluticasone-Umeclidin-Vilant (Trelegy Ellipta) 100-62.5-25 MCG/INH aerosol powder , Inhale 1 puff Daily., Disp: 1  each, Rfl: 11  •  folic acid (FOLVITE) 1 MG tablet, Take 1 mg by mouth Daily., Disp: , Rfl: 1  •  furosemide (LASIX) 20 MG tablet, Take 20 mg by mouth Daily., Disp: , Rfl:   •  gabapentin (NEURONTIN) 600 MG tablet, Take 600 mg by mouth 3 (Three) Times a Day., Disp: , Rfl:   •  HYDROcodone-acetaminophen (NORCO)  MG per tablet, Take 1 tablet by mouth 3 (three) times a day., Disp: , Rfl:   •  hydroxychloroquine (PLAQUENIL) 200 MG tablet, , Disp: , Rfl: 11  •  ipratropium-albuterol (DUO-NEB) 0.5-2.5 mg/3 ml nebulizer, Take 3 mL by nebulization 4 (Four) Times a Day As Needed for Wheezing or Shortness of Air., Disp: 120 mL, Rfl: 11  •  lactulose (CHRONULAC) 10 GM/15ML solution, , Disp: , Rfl: 1  •  levocetirizine (XYZAL) 5 MG tablet, , Disp: , Rfl: 1  •  LINZESS 290 MCG capsule capsule, , Disp: , Rfl:   •  melatonin 3 MG tablet, , Disp: , Rfl:   •  metoclopramide (REGLAN) 10 MG tablet, Take 10 mg by mouth Daily., Disp: , Rfl:   •  metoprolol succinate XL (TOPROL-XL) 50 MG 24 hr tablet, Take 1 tablet by mouth Daily., Disp: 90 tablet, Rfl: 0  •  montelukast (SINGULAIR) 10 MG tablet, Take 1 tablet by mouth Every Night., Disp: 30 tablet, Rfl: 8  •  nystatin (MYCOSTATIN) 065070 UNIT/ML suspension, , Disp: , Rfl:   •  pantoprazole (PROTONIX) 40 MG EC tablet, Take 40 mg by mouth 2 (Two) Times a Day., Disp: , Rfl:   •  QUEtiapine (SEROquel) 25 MG tablet, , Disp: , Rfl:   •  rOPINIRole (REQUIP) 0.5 MG tablet, Take 0.5 mg by mouth Daily., Disp: , Rfl:   •  Stool Softener 100 MG capsule, , Disp: , Rfl:   •  vitamin C (ASCORBIC ACID) 250 MG tablet, Take 250 mg by mouth Daily., Disp: , Rfl:   •  vitamin D (ERGOCALCIFEROL) 60984 units capsule capsule, Take 50,000 Units by mouth Every 14 (Fourteen) Days., Disp: , Rfl:   •  vitamin E 1000 UNIT capsule, , Disp: , Rfl:   •  zolpidem (AMBIEN) 5 MG tablet, , Disp: , Rfl: 0  No Known Allergies  Social History     Socioeconomic History   • Marital status:      Spouse name: Not on  file   • Number of children: 1   • Years of education: Not on file   • Highest education level: Not on file   Tobacco Use   • Smoking status: Current Every Day Smoker     Packs/day: 1.50     Years: 41.00     Pack years: 61.50     Types: Cigarettes   • Smokeless tobacco: Never Used   Vaping Use   • Vaping Use: Never used   Substance and Sexual Activity   • Alcohol use: No   • Drug use: No   • Sexual activity: Defer     Birth control/protection: None     Family History   Problem Relation Age of Onset   • Coronary artery disease Mother    • Heart attack Mother    • Diabetes Mother    • Heart disease Other    • Lung cancer Other    • Heart disease Father    • Hypertension Father    • Cancer Father    • No Known Problems Sister    • Diabetes Brother    • No Known Problems Sister    • No Known Problems Sister    • No Known Problems Sister    • Arthritis Brother      Review of Systems   Constitutional: Positive for malaise/fatigue. Negative for chills, fever, night sweats and weight loss.   HENT: Positive for congestion. Negative for hearing loss, odynophagia and sore throat.    Cardiovascular: Positive for chest pain and dyspnea on exertion. Negative for leg swelling, orthopnea and palpitations.   Respiratory: Positive for cough, shortness of breath and sputum production.    Hematologic/Lymphatic: Does not bruise/bleed easily.   Skin: Negative for itching and rash.   Musculoskeletal: Positive for joint pain. Negative for arthritis, joint swelling and myalgias.   Gastrointestinal: Positive for abdominal pain ( hernias). Negative for constipation, diarrhea, hematemesis, hematochezia, nausea and vomiting.   Genitourinary: Negative for dysuria, frequency and hematuria.   Neurological: Negative for focal weakness, headaches, numbness and seizures.   Psychiatric/Behavioral: Positive for depression. Negative for suicidal ideas. The patient has insomnia.      Vitals:    04/14/21 1248   BP: 145/58   BP Location: Left arm   Pulse:  "85   Temp: 97.7 °F (36.5 °C)   SpO2: 98%   Weight: 70.8 kg (156 lb)   Height: 167.6 cm (66\")      Physical Exam  Vitals reviewed.   Constitutional:       General: She is not in acute distress.     Appearance: Normal appearance.      Comments:  female who appears stated age   HENT:      Head: Normocephalic and atraumatic.      Nose: Nose normal.   Eyes:      General: No scleral icterus.  Cardiovascular:      Rate and Rhythm: Normal rate and regular rhythm.      Heart sounds: No murmur heard.   No friction rub. No gallop.    Pulmonary:      Effort: Pulmonary effort is normal. No respiratory distress.      Breath sounds: Normal breath sounds. No rhonchi.   Abdominal:      General: There is no distension.      Palpations: Abdomen is soft. There is no mass.      Tenderness: There is no abdominal tenderness. There is no guarding or rebound.   Musculoskeletal:         General: No deformity.      Cervical back: Neck supple.      Right lower leg: No edema.      Left lower leg: No edema.   Lymphadenopathy:      Cervical: No cervical adenopathy.      Upper Body:      Right upper body: No supraclavicular or axillary adenopathy.      Left upper body: No supraclavicular or axillary adenopathy.   Skin:     General: Skin is warm and dry.      Coloration: Skin is not jaundiced.   Neurological:      Mental Status: She is alert and oriented to person, place, and time.      Gait: Gait normal.   Psychiatric:         Mood and Affect: Mood normal.         Behavior: Behavior normal.         Thought Content: Thought content normal.         Judgment: Judgment normal.         The ROS, past medical history, surgical history, family history, social history and vitals were reviewed by myself and corrected as needed.      Labs/Imaging:  -CT scan of the chest performed 2/9/2021, personally reviewed, demonstrates a 4 to 5 mm subpleural nodule in the right apex, a 6 mm subpleural nodule in the left lower lobe, 3 mm subpleural nodule in the " right lower lobe posteriorly and 6 to 7 mm nodule in the periphery of the right lower lobe.  -CT scan of the chest performed 5/20/2020, personally reviewed, demonstrates a left lower lobe lung nodule measuring 9 mm.  A right lower lobe lung nodule measures 7 mm.  There are multiple other nodules present that are similar in appearance to the CT CT scan performed on 2/9/2021,  -PET/CT performed 5/29/2020, personally reviewed, demonstrates bilateral pulmonary nodules with no hypermetabolic activity.  These nodules are stable as compared to CT scan performed back in 2014    Assessment/Plan:  61-year-old  female with a history of hypertension, hyperlipidemia, COPD and active tobacco abuse who presents with pulmonary nodules.  The patient had a previous lung biopsy of the [facility] approximately 8 years ago for lung nodules.  The patient cannot recall the results of this biopsy.  Her records will need to be obtained for review.  Given the stability of these nodules, I think it is reasonable to continue with observation.  I recommended a repeat CT scan of the chest in 6 months.  More importantly, I am concerned about the patient's exertional chest pain and recommended that she follow-up with her cardiologist Dr. Coelho for further evaluation.  My office will arrange an appointment.  I will have the patient return to clinic in 6 months to discuss the results of her repeat CT scan of the chest.    Patient Active Problem List   Diagnosis   • Hyperlipidemia   • GERD (gastroesophageal reflux disease)   • Tobacco use   • Obesity   • COPD (chronic obstructive pulmonary disease) (CMS/HCC)   • Chest pain in adult   • Palpitations   • Ovarian tumor (benign), left   • Cigarette nicotine dependence without complication   • Fatigue   • Multiple pulmonary nodules

## 2021-05-03 ENCOUNTER — APPOINTMENT (OUTPATIENT)
Dept: CT IMAGING | Facility: HOSPITAL | Age: 61
End: 2021-05-03

## 2021-10-22 DIAGNOSIS — J43.2 CENTRILOBULAR EMPHYSEMA (HCC): ICD-10-CM

## 2021-10-29 ENCOUNTER — APPOINTMENT (OUTPATIENT)
Dept: CT IMAGING | Facility: HOSPITAL | Age: 61
End: 2021-10-29

## 2021-11-22 ENCOUNTER — APPOINTMENT (OUTPATIENT)
Dept: CT IMAGING | Facility: HOSPITAL | Age: 61
End: 2021-11-22

## 2021-12-15 ENCOUNTER — APPOINTMENT (OUTPATIENT)
Dept: CT IMAGING | Facility: HOSPITAL | Age: 61
End: 2021-12-15

## 2022-01-11 ENCOUNTER — TELEPHONE (OUTPATIENT)
Dept: CARDIOLOGY | Facility: CLINIC | Age: 62
End: 2022-01-11

## 2022-01-11 NOTE — TELEPHONE ENCOUNTER
Called pt. She stated she was in [facility] last week and since she has been home she has had fast heart rate at night to where she cant sleep from her heart beating so hard and fast.  Also states fingertips numb.  I advised her to seek medical attention at the nearest ER if this continues .  I changed her appointment with Dr Coelho from next week to this Thursday to come in.    Called for records of hospital stay at [facility]

## 2022-01-13 ENCOUNTER — OFFICE VISIT (OUTPATIENT)
Dept: CARDIOLOGY | Facility: CLINIC | Age: 62
End: 2022-01-13

## 2022-01-13 VITALS
SYSTOLIC BLOOD PRESSURE: 146 MMHG | BODY MASS INDEX: 29.02 KG/M2 | HEIGHT: 64 IN | TEMPERATURE: 98.4 F | WEIGHT: 170 LBS | DIASTOLIC BLOOD PRESSURE: 68 MMHG | HEART RATE: 99 BPM | OXYGEN SATURATION: 98 %

## 2022-01-13 DIAGNOSIS — I10 PRIMARY HYPERTENSION: ICD-10-CM

## 2022-01-13 DIAGNOSIS — Z72.0 TOBACCO USE: ICD-10-CM

## 2022-01-13 DIAGNOSIS — E78.2 MIXED HYPERLIPIDEMIA: ICD-10-CM

## 2022-01-13 DIAGNOSIS — I47.1 PSVT (PAROXYSMAL SUPRAVENTRICULAR TACHYCARDIA): Primary | ICD-10-CM

## 2022-01-13 DIAGNOSIS — R00.2 PALPITATIONS: ICD-10-CM

## 2022-01-13 PROBLEM — I47.10 PSVT (PAROXYSMAL SUPRAVENTRICULAR TACHYCARDIA): Status: ACTIVE | Noted: 2022-01-13

## 2022-01-13 PROCEDURE — 93000 ELECTROCARDIOGRAM COMPLETE: CPT | Performed by: INTERNAL MEDICINE

## 2022-01-13 PROCEDURE — 93246 EXT ECG>7D<15D RECORDING: CPT | Performed by: INTERNAL MEDICINE

## 2022-01-13 PROCEDURE — 99214 OFFICE O/P EST MOD 30 MIN: CPT | Performed by: INTERNAL MEDICINE

## 2022-01-13 RX ORDER — PLECANATIDE 3 MG/1
1 TABLET ORAL DAILY
COMMUNITY
Start: 2021-12-02

## 2022-01-13 RX ORDER — LUBIPROSTONE 24 UG/1
1 CAPSULE, GELATIN COATED ORAL DAILY
COMMUNITY
Start: 2021-10-08

## 2022-01-13 RX ORDER — MELOXICAM 15 MG/1
15 TABLET ORAL DAILY
COMMUNITY
Start: 2021-12-06

## 2022-01-13 RX ORDER — NALOXONE HYDROCHLORIDE 4 MG/.1ML
SPRAY NASAL AS NEEDED
COMMUNITY
Start: 2021-12-20

## 2022-01-13 RX ORDER — LOSARTAN POTASSIUM 25 MG/1
25 TABLET ORAL DAILY
Qty: 90 TABLET | Refills: 1 | Status: SHIPPED | OUTPATIENT
Start: 2022-01-13

## 2022-01-13 RX ORDER — METOPROLOL SUCCINATE 50 MG/1
50 TABLET, EXTENDED RELEASE ORAL DAILY
Qty: 90 TABLET | Refills: 1 | Status: SHIPPED | OUTPATIENT
Start: 2022-01-13 | End: 2022-02-03 | Stop reason: SDUPTHER

## 2022-01-13 RX ORDER — HYDROXYZINE HYDROCHLORIDE 10 MG/1
1 TABLET, FILM COATED ORAL DAILY
COMMUNITY
Start: 2022-01-03

## 2022-01-13 NOTE — PROGRESS NOTES
subjective     Chief Complaint   Patient presents with   • Palpitations     is getting worse   • Rapid Heart Rate     complains of an increase in HR      History of Present Illness  Patient is 61 years old white female who is here for cardiology follow-up.  Patient states that she had pneumonia and had to be admitted to the hospital because of outpatient therapy failure.    She has been having fast heartbeat.  Heart flies.  She also been short of breath and very tired and fatigued.  Limited activity makes it hard like faster.    She was tested for COVID recordings were it was negative.  She is taking her breathing medications which could be causing fast heartbeat.  She is taking albuterol, Trelegy Ellipta and DuoNeb    She has a history of SVT.  LV functions have been normal by echo.  Patient has not had a stress test which was scheduled.  However she does not have any chest pain  Past Surgical History:   Procedure Laterality Date   • ABDOMINAL SURGERY     • APPENDECTOMY     • CHOLECYSTECTOMY     • COLONOSCOPY  2014   • ENDOSCOPY  2014   • LUNG SURGERY      biopsy right   • MOUTH SURGERY      dental extractions   • SKIN BIOPSY     • TOTAL LAPAROSCOPIC HYSTERECTOMY N/A 1/16/2018    Procedure: ROBOTIC TOTAL LAPAROSCOPIC HYSTERECTOMY WITH BILATERAL SALPINGO OOPHORECTOMY;  Surgeon: Germain Topete MD;  Location: [facility];  Service:      Family History   Problem Relation Age of Onset   • Coronary artery disease Mother    • Heart attack Mother    • Diabetes Mother    • Heart disease Other    • Lung cancer Other    • Heart disease Father    • Hypertension Father    • Cancer Father    • No Known Problems Sister    • Diabetes Brother    • No Known Problems Sister    • No Known Problems Sister    • No Known Problems Sister    • Arthritis Brother    • Lymphoma Brother      Past Medical History:   Diagnosis Date   • Ankle edema    • Anxiety and depression    • Arthritis    • Asthmatic bronchitis    • Atypical chest pain    •  COPD (chronic obstructive pulmonary disease) ([facility])    • Family history of premature coronary artery disease    • GERD (gastroesophageal reflux disease)    • Hiatal hernia    • History of EKG 09/29/2015    NORMAL   • Hyperlipidemia    • Hypertension    • Lung nodules    • Osteoarthritis    • Ovarian mass, left    • Ovarian tumor (benign), left 1/16/2018   • Pulmonary nodule    • Recent weight loss    • Seasonal allergies    • Tobacco abuse    • Wears dentures     top plate     Patient Active Problem List   Diagnosis   • Hyperlipidemia   • GERD (gastroesophageal reflux disease)   • Tobacco use   • Obesity   • COPD (chronic obstructive pulmonary disease) ([facility])   • Chest pain in adult   • Palpitations   • Ovarian tumor (benign), left   • Cigarette nicotine dependence without complication   • Fatigue   • Multiple pulmonary nodules   • PSVT (paroxysmal supraventricular tachycardia) ([facility])   • Primary hypertension       Social History     Tobacco Use   • Smoking status: Current Every Day Smoker     Packs/day: 1.50     Years: 41.00     Pack years: 61.50     Types: Cigarettes   • Smokeless tobacco: Never Used   Vaping Use   • Vaping Use: Never used   Substance Use Topics   • Alcohol use: No   • Drug use: No       No Known Allergies    Current Outpatient Medications on File Prior to Visit   Medication Sig   • Adult Aspirin Regimen 81 MG EC tablet    • albuterol sulfate  (90 Base) MCG/ACT inhaler Inhale 2 puffs Every 4 (Four) Hours As Needed for Wheezing or Shortness of Air.   • alendronate (FOSAMAX) 70 MG tablet Take 1 tablet by mouth 1 (One) Time Per Week. Prior to [facility] Admission, Patient was on: takes on mondays   • Amitiza 24 MCG capsule Take 1 capsule by mouth Daily.   • cilostazol (PLETAL) 50 MG tablet Take 50 mg by mouth Daily.   • diclofenac (VOLTAREN) 3 % gel gel    • diclofenac (VOLTAREN) 50 MG EC tablet Take 50 mg by mouth 2 (two) times a day.   • estradiol (ESTRACE) 2 MG tablet Take 1 tablet by mouth  Daily.   • famotidine (PEPCID) 20 MG tablet    • fenofibrate (TRICOR) 145 MG tablet Take 145 mg by mouth Daily.   • fluticasone (FLONASE) 50 MCG/ACT nasal spray into each nostril.   • furosemide (LASIX) 20 MG tablet Take 20 mg by mouth 2 (Two) Times a Day.   • gabapentin (NEURONTIN) 600 MG tablet Take 600 mg by mouth 3 (Three) Times a Day.   • HYDROcodone-acetaminophen (NORCO)  MG per tablet Take 1 tablet by mouth 3 (three) times a day.   • hydrOXYzine (ATARAX) 10 MG tablet Take 1 tablet by mouth Daily.   • ipratropium-albuterol (DUO-NEB) 0.5-2.5 mg/3 ml nebulizer Take 3 mL by nebulization 4 (Four) Times a Day As Needed for Wheezing or Shortness of Air.   • lactulose (CHRONULAC) 10 GM/15ML solution    • melatonin 3 MG tablet    • meloxicam (MOBIC) 15 MG tablet Take 15 mg by mouth Daily.   • metoclopramide (REGLAN) 10 MG tablet Take 10 mg by mouth Daily.   • montelukast (SINGULAIR) 10 MG tablet Take 1 tablet by mouth Every Night.   • Narcan 4 MG/0.1ML nasal spray As Needed.   • pantoprazole (PROTONIX) 40 MG EC tablet Take 40 mg by mouth 2 (Two) Times a Day.   • Plecanatide (Trulance) 3 MG tablet Take 1 tablet by mouth Daily.   • rOPINIRole (REQUIP) 0.5 MG tablet Take 0.5 mg by mouth Daily.   • Stool Softener 100 MG capsule    • Trelegy Ellipta 100-62.5-25 MCG/INH inhaler INHALE 1 PUFF BY MOUTH ONCE DAILY     No current facility-administered medications on file prior to visit.         The following portions of the patient's history were reviewed and updated as appropriate: allergies, current medications, past family history, past medical history, past social history, past surgical history and problem list.    Review of Systems   Constitutional: Negative.   HENT: Negative.  Negative for congestion.    Eyes: Negative.    Cardiovascular: Positive for palpitations. Negative for chest pain, cyanosis, dyspnea on exertion, irregular heartbeat, leg swelling, near-syncope, orthopnea, paroxysmal nocturnal dyspnea and  "syncope.   Respiratory: Positive for shortness of breath.    Hematologic/Lymphatic: Negative.    Musculoskeletal: Negative.    Gastrointestinal: Negative.    Neurological: Negative.  Negative for headaches.          Objective:     /68   Pulse 99   Temp 98.4 °F (36.9 °C)   Ht 162.6 cm (64\")   Wt 77.1 kg (170 lb)   LMP  (LMP Unknown)   SpO2 98%   BMI 29.18 kg/m²   Pulmonary:      Effort: Pulmonary effort is normal.      Breath sounds: Normal breath sounds. No stridor. No wheezing. No rhonchi. No rales.   Cardiovascular:      PMI at left midclavicular line. Normal rate. Regular rhythm. Normal S1. Normal S2.      Murmurs: There is no murmur.      No gallop. No click. No rub.   Pulses:     Intact distal pulses.   Edema:     Peripheral edema absent.           Lab Review  Lab Results   Component Value Date     01/11/2018    K 4.1 01/11/2018     01/11/2018    BUN 6 (L) 01/11/2018    CREATININE 0.79 01/11/2018    GLUCOSE 80 01/11/2018    CALCIUM 9.0 01/11/2018    ALT 24 02/20/2014    ALKPHOS 68 02/20/2014    LABIL2 1.6 02/20/2014     No results found for: CKTOTAL  Lab Results   Component Value Date    WBC 15.79 (H) 01/17/2018    HGB 12.4 01/17/2018    HCT 39.1 01/17/2018     01/17/2018     No results found for: INR  No results found for: MG  No results found for: PSA, TSH  No results found for: BNP  Lab Results   Component Value Date    CHLPL 165 02/20/2014    TRIG 352 (H) 02/20/2014    HDL 32 (L) 02/20/2014    VLDL 70 (H) 02/20/2014     Lab Results   Component Value Date    LDL 62 02/20/2014         ECG 12 Lead    Date/Time: 1/15/2022 11:22 AM  Performed by: Zia Coelho MD  Authorized by: Zia Coelho MD   Comparison: compared with previous ECG from 2/2/2021  Similar to previous ECG  Rhythm: sinus rhythm  Rate: normal  BPM: 94  QRS axis: normal  Other findings: non-specific ST-T wave changes    Clinical impression: non-specific ECG  Comments: Right ventricular " conduction delay               I personally viewed and interpreted the patient's LAB data         Assessment:     1. PSVT (paroxysmal supraventricular tachycardia) ([facility])    2. Palpitations    3. Tobacco use    4. Mixed hyperlipidemia    5. Primary hypertension          Plan:     Patient complains of palpitations.  Heartbeat is quite fast around 94 bpm.  EKG otherwise does not show any acute changes.  She is taking Toprol-XL 50 mg daily she was advised to increase it to Toprol-XL 50 twice daily    Blood pressure is mildly elevated  She will take losartan 25 mg daily.  Holter monitor was also arranged.  She will be reevaluated in 3 weeks  Cessation of tobacco use stressed.  Healthy lifestyle and better lipid control was discussed.      No follow-ups on file.

## 2022-01-15 PROBLEM — I10 PRIMARY HYPERTENSION: Status: ACTIVE | Noted: 2022-01-15

## 2022-01-17 ENCOUNTER — APPOINTMENT (OUTPATIENT)
Dept: CT IMAGING | Facility: HOSPITAL | Age: 62
End: 2022-01-17

## 2022-01-25 ENCOUNTER — HOSPITAL ENCOUNTER (OUTPATIENT)
Dept: CT IMAGING | Facility: HOSPITAL | Age: 62
End: 2022-01-25

## 2022-01-25 ENCOUNTER — TELEPHONE (OUTPATIENT)
Dept: CARDIAC SURGERY | Facility: CLINIC | Age: 62
End: 2022-01-25

## 2022-01-25 NOTE — TELEPHONE ENCOUNTER
Ms. Concepcion was due for a 6 month follow-up and CT chest in October, but has had to cancel/reschedule her CT scan multiple times. She is currently scheduled on 2/15 @ [facility]. Once pt has scan done, I will have our APRNs review it to see if pt can have a telephone visit since we are booked out in [place] until May.

## 2022-01-26 ENCOUNTER — TELEPHONE (OUTPATIENT)
Dept: CARDIOLOGY | Facility: CLINIC | Age: 62
End: 2022-01-26

## 2022-01-26 NOTE — TELEPHONE ENCOUNTER
Patient c/o starting to feel funny since starting cozaar on 1/14/2022 she is feeling lightheaded, sleeping, weak and hurting in the back of her head,  Her BP is 112/52 HR  94.  Please advise

## 2022-01-26 NOTE — TELEPHONE ENCOUNTER
Spoke to patient told her to   Discontinue losartan and continue Toprol  Per Dr Coelho  Patient voiced understanding

## 2022-02-02 ENCOUNTER — TREATMENT (OUTPATIENT)
Dept: CARDIOLOGY | Facility: CLINIC | Age: 62
End: 2022-02-02

## 2022-02-02 DIAGNOSIS — I47.1 PSVT (PAROXYSMAL SUPRAVENTRICULAR TACHYCARDIA): ICD-10-CM

## 2022-02-02 DIAGNOSIS — R00.2 PALPITATIONS: ICD-10-CM

## 2022-02-02 PROCEDURE — 93248 EXT ECG>7D<15D REV&INTERPJ: CPT | Performed by: INTERNAL MEDICINE

## 2022-02-03 ENCOUNTER — TELEPHONE (OUTPATIENT)
Dept: CARDIOLOGY | Facility: CLINIC | Age: 62
End: 2022-02-03

## 2022-02-03 ENCOUNTER — OFFICE VISIT (OUTPATIENT)
Dept: CARDIOLOGY | Facility: CLINIC | Age: 62
End: 2022-02-03

## 2022-02-03 VITALS
HEIGHT: 64 IN | RESPIRATION RATE: 18 BRPM | HEART RATE: 85 BPM | WEIGHT: 173 LBS | OXYGEN SATURATION: 98 % | DIASTOLIC BLOOD PRESSURE: 64 MMHG | SYSTOLIC BLOOD PRESSURE: 120 MMHG | BODY MASS INDEX: 29.53 KG/M2 | TEMPERATURE: 97.3 F

## 2022-02-03 DIAGNOSIS — I47.29 NONSUSTAINED VENTRICULAR TACHYCARDIA: ICD-10-CM

## 2022-02-03 DIAGNOSIS — R00.2 PALPITATIONS: Primary | ICD-10-CM

## 2022-02-03 DIAGNOSIS — I47.1 PSVT (PAROXYSMAL SUPRAVENTRICULAR TACHYCARDIA): ICD-10-CM

## 2022-02-03 DIAGNOSIS — I10 PRIMARY HYPERTENSION: ICD-10-CM

## 2022-02-03 PROCEDURE — 99214 OFFICE O/P EST MOD 30 MIN: CPT | Performed by: INTERNAL MEDICINE

## 2022-02-03 RX ORDER — METHYLNALTREXONE BROMIDE 150 MG/1
TABLET ORAL
COMMUNITY

## 2022-02-03 RX ORDER — DOXYCYCLINE 100 MG/1
1 TABLET ORAL DAILY
COMMUNITY
Start: 2022-01-24

## 2022-02-03 RX ORDER — METOPROLOL SUCCINATE 50 MG/1
75 TABLET, EXTENDED RELEASE ORAL DAILY
Qty: 135 TABLET | Refills: 1 | Status: SHIPPED | OUTPATIENT
Start: 2022-02-03 | End: 2022-05-03 | Stop reason: SDUPTHER

## 2022-02-03 RX ORDER — BUMETANIDE 1 MG/1
1 TABLET ORAL DAILY
COMMUNITY

## 2022-02-03 RX ORDER — PREDNISONE 10 MG/1
10 TABLET ORAL DAILY
COMMUNITY

## 2022-02-03 NOTE — TELEPHONE ENCOUNTER
Contacted patient again today. Informed her of results. She stated that she is taking metoprolol 50mg daily. Informed her that Dr. Coelho was increasing her metoprolol 50mg to 1.5 tablets daily. Patient verbalized understanding

## 2022-02-03 NOTE — PROGRESS NOTES
subjective     Chief Complaint   Patient presents with   • Rapid Heart Rate     follow up      History of Present Illness    Patient is 61 years old white female who has a history of PSVT.  She complains of palpitations and underwent Holter monitor.  She is here for follow-up.    She also states that her blood pressure is running good.    She planning to have some teeth work done.  Denies any chest pain or shortness of breath.    Past Surgical History:   Procedure Laterality Date   • ABDOMINAL SURGERY     • APPENDECTOMY     • CHOLECYSTECTOMY     • COLONOSCOPY  2014   • ENDOSCOPY  2014   • LUNG SURGERY      biopsy right   • MOUTH SURGERY      dental extractions   • SKIN BIOPSY     • TOTAL LAPAROSCOPIC HYSTERECTOMY N/A 1/16/2018    Procedure: ROBOTIC TOTAL LAPAROSCOPIC HYSTERECTOMY WITH BILATERAL SALPINGO OOPHORECTOMY;  Surgeon: Germain Topete MD;  Location: [facility];  Service:      Family History   Problem Relation Age of Onset   • Coronary artery disease Mother    • Heart attack Mother    • Diabetes Mother    • Heart disease Other    • Lung cancer Other    • Heart disease Father    • Hypertension Father    • Cancer Father    • No Known Problems Sister    • Diabetes Brother    • No Known Problems Sister    • No Known Problems Sister    • No Known Problems Sister    • Arthritis Brother    • Lymphoma Brother      Past Medical History:   Diagnosis Date   • Ankle edema    • Anxiety and depression    • Arthritis    • Asthmatic bronchitis    • Atypical chest pain    • COPD (chronic obstructive pulmonary disease) ([facility])    • Family history of premature coronary artery disease    • GERD (gastroesophageal reflux disease)    • Hiatal hernia    • History of EKG 09/29/2015    NORMAL   • Hyperlipidemia    • Hypertension    • Lung nodules    • Osteoarthritis    • Ovarian mass, left    • Ovarian tumor (benign), left 1/16/2018   • Pulmonary nodule    • Recent weight loss    • Seasonal allergies    • Tobacco abuse    • Wears  dentures     top plate     Patient Active Problem List   Diagnosis   • Hyperlipidemia   • GERD (gastroesophageal reflux disease)   • Tobacco use   • Obesity   • COPD (chronic obstructive pulmonary disease) (H[facility])   • Chest pain in adult   • Palpitations   • Ovarian tumor (benign), left   • Cigarette nicotine dependence without complication   • Fatigue   • Multiple pulmonary nodules   • PSVT (paroxysmal supraventricular tachycardia) ([facility])   • Primary hypertension   • Nonsustained ventricular tachycardia (HCC), 4 beat run       Social History     Tobacco Use   • Smoking status: Current Every Day Smoker     Packs/day: 1.50     Years: 41.00     Pack years: 61.50     Types: Cigarettes   • Smokeless tobacco: Never Used   Vaping Use   • Vaping Use: Never used   Substance Use Topics   • Alcohol use: No   • Drug use: No       No Known Allergies    Current Outpatient Medications on File Prior to Visit   Medication Sig   • Adult Aspirin Regimen 81 MG EC tablet    • albuterol sulfate  (90 Base) MCG/ACT inhaler Inhale 2 puffs Every 4 (Four) Hours As Needed for Wheezing or Shortness of Air.   • alendronate (FOSAMAX) 70 MG tablet Take 1 tablet by mouth 1 (One) Time Per Week. Prior to [facility] Admission, Patient was on: takes on mondays   • Amitiza 24 MCG capsule Take 1 capsule by mouth Daily.   • bumetanide (BUMEX) 1 MG tablet Take 1 mg by mouth Daily.   • cilostazol (PLETAL) 50 MG tablet Take 50 mg by mouth Daily.   • diclofenac (VOLTAREN) 3 % gel gel    • diclofenac (VOLTAREN) 50 MG EC tablet Take 50 mg by mouth 2 (two) times a day.   • doxycycline (ADOXA) 100 MG tablet Take 1 tablet by mouth Daily.   • estradiol (ESTRACE) 2 MG tablet Take 1 tablet by mouth Daily.   • famotidine (PEPCID) 20 MG tablet    • fenofibrate (TRICOR) 145 MG tablet Take 145 mg by mouth Daily.   • fluticasone (FLONASE) 50 MCG/ACT nasal spray into each nostril.   • gabapentin (NEURONTIN) 600 MG tablet Take 600 mg by mouth 3 (Three) Times a Day.   •  HYDROcodone-acetaminophen (NORCO)  MG per tablet Take 1 tablet by mouth 3 (three) times a day.   • hydrOXYzine (ATARAX) 10 MG tablet Take 1 tablet by mouth Daily.   • ipratropium-albuterol (DUO-NEB) 0.5-2.5 mg/3 ml nebulizer Take 3 mL by nebulization 4 (Four) Times a Day As Needed for Wheezing or Shortness of Air.   • lactulose (CHRONULAC) 10 GM/15ML solution    • losartan (COZAAR) 25 MG tablet Take 1 tablet by mouth Daily.   • melatonin 3 MG tablet    • meloxicam (MOBIC) 15 MG tablet Take 15 mg by mouth Daily.   • Methylnaltrexone Bromide (Relistor) 150 MG tablet Take  by mouth.   • metoclopramide (REGLAN) 10 MG tablet Take 10 mg by mouth Daily.   • montelukast (SINGULAIR) 10 MG tablet Take 1 tablet by mouth Every Night.   • Narcan 4 MG/0.1ML nasal spray As Needed.   • pantoprazole (PROTONIX) 40 MG EC tablet Take 40 mg by mouth 2 (Two) Times a Day.   • Plecanatide (Trulance) 3 MG tablet Take 1 tablet by mouth Daily.   • predniSONE (DELTASONE) 10 MG tablet Take 10 mg by mouth Daily.   • rOPINIRole (REQUIP) 0.5 MG tablet Take 0.5 mg by mouth Daily.   • Stool Softener 100 MG capsule    • Trelegy Ellipta 100-62.5-25 MCG/INH inhaler INHALE 1 PUFF BY MOUTH ONCE DAILY   • [DISCONTINUED] metoprolol succinate XL (TOPROL-XL) 50 MG 24 hr tablet Take 1 tablet by mouth Daily.   • [DISCONTINUED] furosemide (LASIX) 20 MG tablet Take 20 mg by mouth 2 (Two) Times a Day.     No current facility-administered medications on file prior to visit.         The following portions of the patient's history were reviewed and updated as appropriate: allergies, current medications, past family history, past medical history, past social history, past surgical history and problem list.    Review of Systems   Constitutional: Negative.   HENT: Negative.  Negative for congestion.    Eyes: Negative.    Cardiovascular: Positive for palpitations. Negative for chest pain, cyanosis, dyspnea on exertion, irregular heartbeat, leg swelling,  "near-syncope, orthopnea, paroxysmal nocturnal dyspnea and syncope.   Respiratory: Negative.  Negative for shortness of breath.    Hematologic/Lymphatic: Negative.    Musculoskeletal: Negative.    Gastrointestinal: Negative.    Neurological: Negative.  Negative for headaches.          Objective:     /64 (BP Location: Right arm, Patient Position: Sitting, Cuff Size: Adult)   Pulse 85   Temp 97.3 °F (36.3 °C)   Resp 18   Ht 162.6 cm (64\")   Wt 78.5 kg (173 lb)   LMP  (LMP Unknown)   SpO2 98%   BMI 29.70 kg/m²   Pulmonary:      Effort: Pulmonary effort is normal.      Breath sounds: Normal breath sounds. No stridor. No wheezing. No rhonchi. No rales.   Cardiovascular:      PMI at left midclavicular line. Normal rate. Regular rhythm. Normal S1. Normal S2.      Murmurs: There is no murmur.      No gallop. No click. No rub.   Pulses:     Intact distal pulses.   Edema:     Peripheral edema absent.           Lab Review  Lab Results   Component Value Date     01/11/2018    K 4.1 01/11/2018     01/11/2018    BUN 6 (L) 01/11/2018    CREATININE 0.79 01/11/2018    GLUCOSE 80 01/11/2018    CALCIUM 9.0 01/11/2018    ALT 24 02/20/2014    ALKPHOS 68 02/20/2014    LABIL2 1.6 02/20/2014     No results found for: CKTOTAL  Lab Results   Component Value Date    WBC 15.79 (H) 01/17/2018    HGB 12.4 01/17/2018    HCT 39.1 01/17/2018     01/17/2018     No results found for: INR  No results found for: MG  No results found for: PSA, TSH  No results found for: BNP  Lab Results   Component Value Date    CHLPL 165 02/20/2014    TRIG 352 (H) 02/20/2014    HDL 32 (L) 02/20/2014    VLDL 70 (H) 02/20/2014     Lab Results   Component Value Date    LDL 62 02/20/2014       Procedures       I personally viewed and interpreted the patient's LAB data         Assessment:     1. Palpitations    2. PSVT (paroxysmal supraventricular tachycardia) (HCC)    3. Nonsustained ventricular tachycardia (HCC), 4 beat run    4. Primary " hypertension          Plan:     Patient is 61 years old white female who has paroxysmal supraventricular tachycardia and has been having palpitations.  Holter monitor showed multiple short runs of SVT and one 4 beat run of V. tach.    Patient is otherwise asymptomatic  Echocardiogram last year showed normal LV functions.  Patient has not had any stress test but she denies any chest pain.    She was advised to increase metoprolol ER 75 mg daily.  She will continue losartan for hypertension.  She was advised to check her blood pressure closely to make sure it does not go down too low.  Stress test was again discussed.  Patient plans to have that done after her surgery.  Clinically I do not feel patient has any significant coronary artery disease.  She should be okay for her surgery.    Follow-up scheduled  No follow-ups on file.

## 2022-02-03 NOTE — TELEPHONE ENCOUNTER
----- Message from Zia Coelho MD sent at 2/2/2022 11:46 AM EST -----  Holter monitor shows frequent runs of fast heartbeat with supraventricular tachycardia and short run of V. tach.How much metoprolol are you taking now.  ----- Message -----  From: Lucho Wilkinson MA  Sent: 2/2/2022   9:57 AM EST  To: Zia Coelho MD    Please review EOS holter monitor.     Thanks,   Lucho

## 2022-02-08 ENCOUNTER — TRANSCRIBE ORDERS (OUTPATIENT)
Dept: ADMINISTRATIVE | Facility: HOSPITAL | Age: 62
End: 2022-02-08

## 2022-02-08 DIAGNOSIS — M27.2 ABSCESS OF JAW: Primary | ICD-10-CM

## 2022-02-09 ENCOUNTER — HOSPITAL ENCOUNTER (OUTPATIENT)
Dept: CT IMAGING | Facility: HOSPITAL | Age: 62
Discharge: HOME | End: 2022-02-09
Admitting: STUDENT IN AN ORGANIZED HEALTH CARE EDUCATION/TRAINING PROGRAM

## 2022-02-09 DIAGNOSIS — M27.2 ABSCESS OF JAW: ICD-10-CM

## 2022-02-09 LAB — CREAT BLDA-MCNC: 1.1 MG/DL (ref 0.6–1.3)

## 2022-02-09 PROCEDURE — 70487 CT MAXILLOFACIAL W/DYE: CPT

## 2022-02-09 PROCEDURE — 0 IOVERSOL 68 % SOLUTION: Performed by: STUDENT IN AN ORGANIZED HEALTH CARE EDUCATION/TRAINING PROGRAM

## 2022-02-09 PROCEDURE — 82565 ASSAY OF CREATININE: CPT

## 2022-02-09 PROCEDURE — 70487 CT MAXILLOFACIAL W/DYE: CPT | Performed by: RADIOLOGY

## 2022-02-09 RX ADMIN — IOVERSOL 70 ML: 678 INJECTION INTRA-ARTERIAL; INTRAVENOUS at 09:03

## 2022-02-15 ENCOUNTER — HOSPITAL ENCOUNTER (OUTPATIENT)
Dept: CT IMAGING | Facility: HOSPITAL | Age: 62
Discharge: HOME | End: 2022-02-15
Admitting: THORACIC SURGERY (CARDIOTHORACIC VASCULAR SURGERY)

## 2022-02-15 DIAGNOSIS — R91.8 MULTIPLE PULMONARY NODULES: ICD-10-CM

## 2022-02-15 PROCEDURE — 71250 CT THORAX DX C-: CPT

## 2022-02-15 PROCEDURE — 71250 CT THORAX DX C-: CPT | Performed by: RADIOLOGY

## 2022-05-03 ENCOUNTER — OFFICE VISIT (OUTPATIENT)
Dept: CARDIOLOGY | Facility: CLINIC | Age: 62
End: 2022-05-03

## 2022-05-03 VITALS
WEIGHT: 169.2 LBS | HEIGHT: 64 IN | DIASTOLIC BLOOD PRESSURE: 64 MMHG | HEART RATE: 84 BPM | OXYGEN SATURATION: 96 % | SYSTOLIC BLOOD PRESSURE: 122 MMHG | TEMPERATURE: 97.8 F | BODY MASS INDEX: 28.89 KG/M2

## 2022-05-03 DIAGNOSIS — I47.1 PSVT (PAROXYSMAL SUPRAVENTRICULAR TACHYCARDIA): ICD-10-CM

## 2022-05-03 DIAGNOSIS — I47.29 NONSUSTAINED VENTRICULAR TACHYCARDIA: ICD-10-CM

## 2022-05-03 DIAGNOSIS — R00.2 PALPITATIONS: Primary | ICD-10-CM

## 2022-05-03 DIAGNOSIS — I10 PRIMARY HYPERTENSION: ICD-10-CM

## 2022-05-03 PROCEDURE — 99214 OFFICE O/P EST MOD 30 MIN: CPT | Performed by: INTERNAL MEDICINE

## 2022-05-03 RX ORDER — METOPROLOL SUCCINATE 50 MG/1
100 TABLET, EXTENDED RELEASE ORAL DAILY
Qty: 180 TABLET | Refills: 1 | Status: SHIPPED | OUTPATIENT
Start: 2022-05-03 | End: 2023-04-03 | Stop reason: SDUPTHER

## 2022-05-03 NOTE — PROGRESS NOTES
subjective     Chief Complaint   Patient presents with   • Palpitations     Follow up      History of Present Illness    Patient is 62 years old white female with history of PSVT and nonsustained V. tach on metoprolol therapy.  She is doing very well denies any palpitations chest pain or shortness of breath.  Heart rate is around 84 bpm will increase metoprolol.    Blood pressure is very well controlled with losartan  Patient currently is asymptomatic from cardiac standpoint he complains of lot of arthritis    Past Surgical History:   Procedure Laterality Date   • ABDOMINAL SURGERY     • APPENDECTOMY     • CHOLECYSTECTOMY     • COLONOSCOPY  2014   • ENDOSCOPY  2014   • LUNG SURGERY      biopsy right   • MOUTH SURGERY      dental extractions   • SKIN BIOPSY     • TOTAL LAPAROSCOPIC HYSTERECTOMY N/A 1/16/2018    Procedure: ROBOTIC TOTAL LAPAROSCOPIC HYSTERECTOMY WITH BILATERAL SALPINGO OOPHORECTOMY;  Surgeon: Germain Topete MD;  Location: [facility];  Service:      Family History   Problem Relation Age of Onset   • Coronary artery disease Mother    • Heart attack Mother    • Diabetes Mother    • Heart disease Other    • Lung cancer Other    • Heart disease Father    • Hypertension Father    • Cancer Father    • No Known Problems Sister    • Diabetes Brother    • No Known Problems Sister    • No Known Problems Sister    • No Known Problems Sister    • Arthritis Brother    • Lymphoma Brother      Past Medical History:   Diagnosis Date   • Ankle edema    • Anxiety and depression    • Arthritis    • Asthmatic bronchitis    • Atypical chest pain    • COPD (chronic obstructive pulmonary disease) (HCC)    • Family history of premature coronary artery disease    • GERD (gastroesophageal reflux disease)    • Hiatal hernia    • History of EKG 09/29/2015    NORMAL   • Hyperlipidemia    • Hypertension    • Lung nodules    • Osteoarthritis    • Ovarian mass, left    • Ovarian tumor (benign), left 1/16/2018   • Pulmonary nodule     • Recent weight loss    • Seasonal allergies    • Tobacco abuse    • Wears dentures     top plate     Patient Active Problem List   Diagnosis   • Hyperlipidemia   • GERD (gastroesophageal reflux disease)   • Tobacco use   • Obesity   • COPD (chronic obstructive pulmonary disease) ([facility])   • Chest pain in adult   • Palpitations   • Ovarian tumor (benign), left   • Cigarette nicotine dependence without complication   • Fatigue   • Multiple pulmonary nodules   • PSVT (paroxysmal supraventricular tachycardia) ([facility])   • Primary hypertension   • Nonsustained ventricular tachycardia ([facility]), 4 beat run       Social History     Tobacco Use   • Smoking status: Current Every Day Smoker     Packs/day: 1.50     Years: 41.00     Pack years: 61.50     Types: Cigarettes   • Smokeless tobacco: Never Used   Vaping Use   • Vaping Use: Never used   Substance Use Topics   • Alcohol use: No   • Drug use: No       No Known Allergies    Current Outpatient Medications on File Prior to Visit   Medication Sig   • Adult Aspirin Regimen 81 MG EC tablet    • albuterol sulfate  (90 Base) MCG/ACT inhaler Inhale 2 puffs Every 4 (Four) Hours As Needed for Wheezing or Shortness of Air.   • alendronate (FOSAMAX) 70 MG tablet Take 1 tablet by mouth 1 (One) Time Per Week. Prior to [facility] [place] Admission, Patient was on: takes on mondays   • Amitiza 24 MCG capsule Take 1 capsule by mouth Daily.   • bumetanide (BUMEX) 1 MG tablet Take 1 mg by mouth Daily.   • cilostazol (PLETAL) 50 MG tablet Take 50 mg by mouth Daily.   • diclofenac (VOLTAREN) 3 % gel gel    • diclofenac (VOLTAREN) 50 MG EC tablet Take 50 mg by mouth 2 (two) times a day.   • doxycycline (ADOXA) 100 MG tablet Take 1 tablet by mouth Daily.   • estradiol (ESTRACE) 2 MG tablet Take 1 tablet by mouth Daily.   • famotidine (PEPCID) 20 MG tablet    • fenofibrate (TRICOR) 145 MG tablet Take 145 mg by mouth Daily.   • fluticasone (FLONASE) 50 MCG/ACT nasal spray into each nostril.   • gabapentin  (NEURONTIN) 600 MG tablet Take 600 mg by mouth 3 (Three) Times a Day.   • HYDROcodone-acetaminophen (NORCO)  MG per tablet Take 1 tablet by mouth 3 (three) times a day.   • hydrOXYzine (ATARAX) 10 MG tablet Take 1 tablet by mouth Daily.   • ipratropium-albuterol (DUO-NEB) 0.5-2.5 mg/3 ml nebulizer Take 3 mL by nebulization 4 (Four) Times a Day As Needed for Wheezing or Shortness of Air.   • lactulose (CHRONULAC) 10 GM/15ML solution    • losartan (COZAAR) 25 MG tablet Take 1 tablet by mouth Daily.   • melatonin 3 MG tablet    • meloxicam (MOBIC) 15 MG tablet Take 15 mg by mouth Daily.   • Methylnaltrexone Bromide (Relistor) 150 MG tablet Take  by mouth.   • metoclopramide (REGLAN) 10 MG tablet Take 10 mg by mouth Daily.   • montelukast (SINGULAIR) 10 MG tablet Take 1 tablet by mouth Every Night.   • Narcan 4 MG/0.1ML nasal spray As Needed.   • pantoprazole (PROTONIX) 40 MG EC tablet Take 40 mg by mouth 2 (Two) Times a Day.   • Plecanatide (Trulance) 3 MG tablet Take 1 tablet by mouth Daily.   • predniSONE (DELTASONE) 10 MG tablet Take 10 mg by mouth Daily.   • rOPINIRole (REQUIP) 0.5 MG tablet Take 0.5 mg by mouth Daily.   • Stool Softener 100 MG capsule    • Trelegy Ellipta 100-62.5-25 MCG/INH inhaler INHALE 1 PUFF BY MOUTH ONCE DAILY   • [DISCONTINUED] metoprolol succinate XL (TOPROL-XL) 50 MG 24 hr tablet Take 1.5 tablets by mouth Daily.     No current facility-administered medications on file prior to visit.         The following portions of the patient's history were reviewed and updated as appropriate: allergies, current medications, past family history, past medical history, past social history, past surgical history and problem list.    Review of Systems   Constitutional: Negative.   HENT: Negative.  Negative for congestion.    Eyes: Negative.    Cardiovascular: Negative.  Negative for chest pain, cyanosis, dyspnea on exertion, irregular heartbeat, leg swelling, near-syncope, orthopnea, palpitations,  "paroxysmal nocturnal dyspnea and syncope.   Respiratory: Negative.  Negative for shortness of breath.    Hematologic/Lymphatic: Negative.    Musculoskeletal: Positive for arthritis.   Gastrointestinal: Negative.    Neurological: Negative.  Negative for headaches.          Objective:     /64 (BP Location: Right arm, Patient Position: Sitting, Cuff Size: Adult)   Pulse 84   Temp 97.8 °F (36.6 °C) (Temporal)   Ht 162.6 cm (64\")   Wt 76.7 kg (169 lb 3.2 oz)   LMP  (LMP Unknown)   SpO2 96%   BMI 29.04 kg/m²   Pulmonary:      Effort: Pulmonary effort is normal.      Breath sounds: Normal breath sounds. No stridor. No wheezing. No rhonchi. No rales.   Cardiovascular:      PMI at left midclavicular line. Normal rate. Regular rhythm. Normal S1. Normal S2.      Murmurs: There is no murmur.      No gallop. No click. No rub.   Pulses:     Intact distal pulses.   Edema:     Peripheral edema absent.           Lab Review  No lab work this visit  Procedures       I personally viewed and interpreted the patient's LAB data         Assessment:     1. Palpitations    2. Nonsustained ventricular tachycardia (HCC), 4 beat run    3. Primary hypertension    4. PSVT (paroxysmal supraventricular tachycardia) (HCC)          Plan:     Patient has history of PSVT and nonsustained V. tach.  She is doing very well with metoprolol XL 75 mg daily.  Heart rate is still slightly fast and runs between 80-90 per minute  Metoprolol ER dose was increased 200 mg daily.    Blood pressure is very well controlled with losartan which was continued.    Patient has COPD and was advised to decrease metoprolol if it causes any problems.  Follow-up scheduled        No follow-ups on file.  "

## 2022-06-08 ENCOUNTER — OFFICE VISIT (OUTPATIENT)
Dept: CARDIAC SURGERY | Facility: CLINIC | Age: 62
End: 2022-06-08

## 2022-06-08 VITALS
TEMPERATURE: 97.1 F | WEIGHT: 170 LBS | SYSTOLIC BLOOD PRESSURE: 130 MMHG | BODY MASS INDEX: 27.32 KG/M2 | OXYGEN SATURATION: 98 % | HEART RATE: 68 BPM | HEIGHT: 66 IN | DIASTOLIC BLOOD PRESSURE: 70 MMHG

## 2022-06-08 DIAGNOSIS — R91.8 MULTIPLE PULMONARY NODULES: Primary | ICD-10-CM

## 2022-06-08 PROCEDURE — 99214 OFFICE O/P EST MOD 30 MIN: CPT | Performed by: NURSE PRACTITIONER

## 2022-06-08 NOTE — PROGRESS NOTES
[facility] Office Follow Up Note     Date of Encounter: 2022     Name: Alanna Concepcion  : 1960     Referred By: No ref. provider found  PCP: Darby Rosario MD    Chief Complaint:    Chief Complaint   Patient presents with   • Follow-up     6 MO FU with CT Chest for Right Lung Nodule       Subjective      History of Present Illness:    Alanna Concepcion is a 62 y.o. female current smoker with a history of HTN, HLD, COPD, SVT. and pulmonary nodules followed by Dr. La. Patient was last seen in clinic by Dr. la on 21, with remote history of previous lung biopsy at the [facility] approximately 8 years ago (data deficient).  Patient was found to have stable lung nodules but with complaints of exertional chest pain and recommendations for evaluation with Dr. Coelho. She has since been evaluated by Dr. Coelho multiple times without complaints of chest pain. She presents today for follow up. Since last visit, she has been undergoing treatment with infectious disease and oral surgery at  for osteomyelitis after tooth extraction complicated by poor healing.  She currently denies any changes to her respiratory status including worsening SOA, cough, hemoptysis, or chest pain.  She denies any unintentional weight loss, lymphadenopathy, fevers, chills, or body aches.  She is continue to follow closely with Dr. Coelho with interval diagnosis of SVT now controlled on medication therapy and improvement in chest pain. She does unfortunately continue to smoke.     Review of Systems:  Review of Systems   Constitutional: Negative for chills, fever, malaise/fatigue, night sweats and weight loss.   HENT: Negative for hearing loss, odynophagia and sore throat.    Cardiovascular: Positive for dyspnea on exertion, leg swelling and palpitations. Negative for chest pain and orthopnea.   Respiratory: Positive for cough, shortness of breath and wheezing. Negative for hemoptysis.     Endocrine: Negative for cold intolerance, heat intolerance, polydipsia, polyphagia and polyuria.   Hematologic/Lymphatic: Bruises/bleeds easily.   Skin: Negative for itching and rash.   Musculoskeletal: Positive for arthritis, back pain and joint pain. Negative for joint swelling and myalgias.   Gastrointestinal: Positive for abdominal pain, constipation and diarrhea. Negative for hematemesis, hematochezia, melena, nausea and vomiting.   Genitourinary: Negative for dysuria, frequency and hematuria.   Neurological: Positive for dizziness and light-headedness. Negative for focal weakness, headaches, numbness and seizures.   Psychiatric/Behavioral: Positive for depression. Negative for suicidal ideas. The patient is nervous/anxious.    All other systems reviewed and are negative.      I have reviewed the following portions of the patient's history: allergies, current medications, past family history, past medical history, past social history, past surgical history, problem list and ROS and confirm it's accurate.    Allergies:  No Known Allergies    Medications:      Current Outpatient Medications:   •  Adult Aspirin Regimen 81 MG EC tablet, , Disp: , Rfl:   •  albuterol sulfate  (90 Base) MCG/ACT inhaler, Inhale 2 puffs Every 4 (Four) Hours As Needed for Wheezing or Shortness of Air., Disp: 18 g, Rfl: 8  •  alendronate (FOSAMAX) 70 MG tablet, Take 1 tablet by mouth 1 (One) Time Per Week. Prior to [facility] [place] Admission, Patient was on: takes on mondays, Disp: , Rfl:   •  bumetanide (BUMEX) 1 MG tablet, Take 1 mg by mouth Daily., Disp: , Rfl:   •  cilostazol (PLETAL) 50 MG tablet, Take 50 mg by mouth Daily., Disp: , Rfl:   •  diclofenac (VOLTAREN) 3 % gel gel, , Disp: , Rfl:   •  diclofenac (VOLTAREN) 50 MG EC tablet, Take 50 mg by mouth 2 (two) times a day., Disp: , Rfl:   •  estradiol (ESTRACE) 2 MG tablet, Take 1 tablet by mouth Daily., Disp: 100 tablet, Rfl: 3  •  fenofibrate (TRICOR) 145 MG tablet, Take 145 mg  by mouth Daily., Disp: , Rfl:   •  fluticasone (FLONASE) 50 MCG/ACT nasal spray, into each nostril., Disp: , Rfl:   •  gabapentin (NEURONTIN) 600 MG tablet, Take 600 mg by mouth 3 (Three) Times a Day., Disp: , Rfl:   •  HYDROcodone-acetaminophen (NORCO)  MG per tablet, Take 1 tablet by mouth 3 (three) times a day., Disp: , Rfl:   •  hydrOXYzine (ATARAX) 10 MG tablet, Take 1 tablet by mouth Daily., Disp: , Rfl:   •  ipratropium-albuterol (DUO-NEB) 0.5-2.5 mg/3 ml nebulizer, Take 3 mL by nebulization 4 (Four) Times a Day As Needed for Wheezing or Shortness of Air., Disp: 120 mL, Rfl: 11  •  lactulose (CHRONULAC) 10 GM/15ML solution, , Disp: , Rfl: 1  •  losartan (COZAAR) 25 MG tablet, Take 1 tablet by mouth Daily., Disp: 90 tablet, Rfl: 1  •  melatonin 3 MG tablet, , Disp: , Rfl:   •  meloxicam (MOBIC) 15 MG tablet, Take 15 mg by mouth Daily., Disp: , Rfl:   •  Methylnaltrexone Bromide (Relistor) 150 MG tablet, Take  by mouth., Disp: , Rfl:   •  metoclopramide (REGLAN) 10 MG tablet, Take 10 mg by mouth Daily., Disp: , Rfl:   •  metoprolol succinate XL (TOPROL-XL) 50 MG 24 hr tablet, Take 2 tablets by mouth Daily., Disp: 180 tablet, Rfl: 1  •  montelukast (SINGULAIR) 10 MG tablet, Take 1 tablet by mouth Every Night., Disp: 30 tablet, Rfl: 8  •  pantoprazole (PROTONIX) 40 MG EC tablet, Take 40 mg by mouth 2 (Two) Times a Day., Disp: , Rfl:   •  Plecanatide (Trulance) 3 MG tablet, Take 1 tablet by mouth Daily., Disp: , Rfl:   •  rOPINIRole (REQUIP) 0.5 MG tablet, Take 0.5 mg by mouth Daily., Disp: , Rfl:   •  Stool Softener 100 MG capsule, , Disp: , Rfl:   •  Trelegy Ellipta 100-62.5-25 MCG/INH inhaler, INHALE 1 PUFF BY MOUTH ONCE DAILY, Disp: 60 each, Rfl: 11  •  Amitiza 24 MCG capsule, Take 1 capsule by mouth Daily. (Patient not taking: Reported on 6/8/2022), Disp: , Rfl:   •  doxycycline (ADOXA) 100 MG tablet, Take 1 tablet by mouth Daily. (Patient not taking: Reported on 6/8/2022), Disp: , Rfl:   •  famotidine  (PEPCID) 20 MG tablet, , Disp: , Rfl:   •  Narcan 4 MG/0.1ML nasal spray, As Needed. (Patient not taking: Reported on 6/8/2022), Disp: , Rfl:   •  predniSONE (DELTASONE) 10 MG tablet, Take 10 mg by mouth Daily. (Patient not taking: Reported on 6/8/2022), Disp: , Rfl:     History:   Past Medical History:   Diagnosis Date   • Abdominal hernia    • Ankle edema    • Anxiety and depression    • Arthritis    • Asthmatic bronchitis    • Atypical chest pain    • COPD (chronic obstructive pulmonary disease) (HCC)    • Family history of premature coronary artery disease    • GERD (gastroesophageal reflux disease)    • Hiatal hernia    • Hiatal hernia    • History of EKG 09/29/2015    NORMAL   • Hyperlipidemia    • Hypertension    • Lung nodules    • Osteoarthritis    • Ovarian mass, left    • Ovarian tumor (benign), left 01/16/2018   • Pulmonary nodule    • Recent weight loss    • Seasonal allergies    • Tobacco abuse    • Wears dentures     top plate       Past Surgical History:   Procedure Laterality Date   • ABDOMINAL SURGERY     • APPENDECTOMY     • CHOLECYSTECTOMY     • COLONOSCOPY  2014   • ENDOSCOPY  2014   • LUNG SURGERY      biopsy right   • MOUTH SURGERY      dental extractions   • SKIN BIOPSY     • TOTAL LAPAROSCOPIC HYSTERECTOMY N/A 1/16/2018    Procedure: ROBOTIC TOTAL LAPAROSCOPIC HYSTERECTOMY WITH BILATERAL SALPINGO OOPHORECTOMY;  Surgeon: Germain Topete MD;  Location: [facility];  Service:        Social History     Socioeconomic History   • Marital status:    • Number of children: 1   Tobacco Use   • Smoking status: Current Every Day Smoker     Packs/day: 1.50     Years: 41.00     Pack years: 61.50     Types: Cigarettes   • Smokeless tobacco: Never Used   Vaping Use   • Vaping Use: Never used   Substance and Sexual Activity   • Alcohol use: No   • Drug use: No   • Sexual activity: Defer     Birth control/protection: None        Family History   Problem Relation Age of Onset   • Coronary artery  "disease Mother    • Heart attack Mother    • Diabetes Mother    • Heart disease Other    • Lung cancer Other    • Heart disease Father    • Hypertension Father    • Cancer Father    • No Known Problems Sister    • Diabetes Brother    • No Known Problems Sister    • No Known Problems Sister    • No Known Problems Sister    • Arthritis Brother    • Lymphoma Brother        Objective     Physical Exam:  Vitals:    06/08/22 1009   BP: 130/70   BP Location: Left arm   Patient Position: Sitting   Pulse: 68   Temp: 97.1 °F (36.2 °C)   SpO2: 98%   Weight: 77.1 kg (170 lb)   Height: 167.6 cm (66\")      Body mass index is 27.44 kg/m².    Physical Exam  Vitals and nursing note reviewed.   Constitutional:       Appearance: Normal appearance. She is well-developed.   HENT:      Head: Normocephalic and atraumatic.   Eyes:      Pupils: Pupils are equal, round, and reactive to light.   Neck:      Vascular: No carotid bruit.   Cardiovascular:      Rate and Rhythm: Normal rate and regular rhythm.      Pulses: Normal pulses.      Heart sounds: Normal heart sounds, S1 normal and S2 normal. No murmur heard.  Pulmonary:      Effort: Pulmonary effort is normal.      Breath sounds: Normal breath sounds.   Abdominal:      Palpations: Abdomen is soft.   Musculoskeletal:         General: No swelling.      Cervical back: Neck supple.      Right lower leg: No edema.      Left lower leg: No edema.   Skin:     General: Skin is warm and dry.      Capillary Refill: Capillary refill takes less than 2 seconds.      Findings: No bruising.   Neurological:      General: No focal deficit present.      Mental Status: She is alert and oriented to person, place, and time. Mental status is at baseline.      GCS: GCS eye subscore is 4. GCS verbal subscore is 5. GCS motor subscore is 6.      Motor: Motor function is intact.      Coordination: Coordination is intact.      Gait: Gait is intact.   Psychiatric:         Mood and Affect: Mood normal.         Speech: " Speech normal.         Behavior: Behavior normal. Behavior is cooperative.         Cognition and Memory: Cognition normal.         Imaging/Labs:    CT CHEST WO Contrast 2/15/22:   CT FINDINGS: There continues to be a well-circumscribed pleural-based  nodule posteriorly in the apex of the right lung. It was present on the  earlier CT as well. It currently measures approximately 8 mm in greatest  dimension. Just inferior to this are some vascular clips from previous  lung surgery. There continues to be some interstitial scarring in the  right middle lobe not significantly changed from the previous study.  There is a right lower lobe lung nodule that is laterally located and is  stable at 7 mm. There is a small pleural-based nodule posteriorly in the  right lung base. It measures 3 mm. On the mediastinal windows there were  no enlarged lymph nodes in the mediastinum or hilar areas. The heart was  not enlarged. There were no pericardial effusions. The scans through the  upper abdomen show diffuse fatty changes in the liver and a large 5.5 cm  cyst in the left kidney.  IMPRESSION:  There continue be several small lung nodules which are  radiographically stable in comparing with the exam done in 2020.     CT scan of the chest performed 2/9/2021:  demonstrates a 4 to 5 mm subpleural nodule in the right apex, a 6 mm subpleural nodule in the left lower lobe, 3 mm subpleural nodule in the right lower lobe posteriorly and 6 to 7 mm nodule in the periphery of the right lower lobe.    CT scan of the chest performed 5/20/2020:  demonstrates a left lower lobe lung nodule measuring 9 mm.  A right lower lobe lung nodule measures 7 mm.  There are multiple other nodules present that are similar in appearance to the CT CT scan performed on 2/9/2021,    PET/CT performed 5/29/2020:  demonstrates bilateral pulmonary nodules with no hypermetabolic activity.  These nodules are stable as compared to CT scan performed back in  2014    Assessment / Plan      Assessment / Plan:  Diagnoses and all orders for this visit:    1. Multiple pulmonary nodules (Primary)       1.  Multiple lung nodules followed by Dr. La: Patient denies any interval worsening constitutional symptoms. She has continued to follow closely with Dr. Coelho for cardiology surveillance. Patient has also continued to follow closely with oral surgery and ID at  for osteomyelitis following dental infection after extraction. Reviewed patient's CT Chest from February 2022 revealing stable multiple pulmonary nodules: 6-7mm subpleural LLL nodule, 5mm subpleural nodule in the right apex, 6mm subpleural nodule RLL and 2 2-3mm small subpleural RLL nodules. Will plan to continue surveillance of lung nodules with repeat CT Chest one year from her previous scan in February 2023.     Patient Education:  Alanna Concepcion  reports that she has been smoking cigarettes. She has a 61.50 pack-year smoking history. She has never used smokeless tobacco.. I have educated her on the risk of diseases from using tobacco products such as cancer, COPD and heart disease.     I advised her to quit and she is not willing to quit.    I spent 5 minutes counseling the patient.         Follow Up:   Return in about 8 months (around 2/8/2023) for F/u with imaging.   Or sooner for any further concerns or worsening sign and symptoms. If unable to reach us in the office please dial 911 or go to the nearest emergency department.      Cindy OCASIO  [facility] Cardiothoracic Surgery    Time Spent: I spent 34 minutes caring for Alanna on this date of service. This time includes time spent by me in the following activities: preparing for the visit, reviewing tests, obtaining and/or reviewing a separately obtained history, performing a medically appropriate examination and/or evaluation, counseling and educating the patient/family/caregiver, ordering medications, tests, or procedures, documenting  information in the medical record, independently interpreting results and communicating that information with the patient/family/caregiver and care coordination.

## 2022-11-14 DIAGNOSIS — J43.2 CENTRILOBULAR EMPHYSEMA: ICD-10-CM

## 2022-11-14 RX ORDER — FLUTICASONE FUROATE, UMECLIDINIUM BROMIDE AND VILANTEROL TRIFENATATE 100; 62.5; 25 UG/1; UG/1; UG/1
POWDER RESPIRATORY (INHALATION)
Qty: 60 EACH | Refills: 11 | Status: SHIPPED | OUTPATIENT
Start: 2022-11-14 | End: 2022-11-21

## 2022-11-21 DIAGNOSIS — J43.2 CENTRILOBULAR EMPHYSEMA: ICD-10-CM

## 2022-11-21 RX ORDER — FLUTICASONE FUROATE, UMECLIDINIUM BROMIDE AND VILANTEROL TRIFENATATE 100; 62.5; 25 UG/1; UG/1; UG/1
POWDER RESPIRATORY (INHALATION)
Qty: 60 EACH | Refills: 11 | Status: SHIPPED | OUTPATIENT
Start: 2022-11-21

## 2023-01-11 DIAGNOSIS — R91.8 MULTIPLE PULMONARY NODULES: Primary | ICD-10-CM

## 2023-02-10 ENCOUNTER — TELEPHONE (OUTPATIENT)
Dept: CARDIAC SURGERY | Facility: CLINIC | Age: 63
End: 2023-02-10

## 2023-02-10 NOTE — TELEPHONE ENCOUNTER
Caller: Alanna Concepcion    Relationship to patient: Self    Best call back number: [phone]    Chief complaint: DOES NOT WANT TO TRAVEL TO [place]     Type of visit: FOLLOW UP TO CT     Requested date: RD OR TELEPHONE VISIT      If rescheduling, when is the original appointment: 2/28/23    Additional notes: PATIENT WOULD LIKE A CALL BACK

## 2023-03-07 DIAGNOSIS — M25.561 RIGHT KNEE PAIN, UNSPECIFIED CHRONICITY: Primary | ICD-10-CM

## 2023-03-09 DIAGNOSIS — M25.561 RIGHT KNEE PAIN, UNSPECIFIED CHRONICITY: Primary | ICD-10-CM

## 2023-03-09 NOTE — ADDENDUM NOTE
Addended by: JAYANT CASSIDY on: 3/9/2023 11:18 AM     Modules accepted: Level of Service, SmartSet

## 2023-04-03 RX ORDER — METOPROLOL SUCCINATE 50 MG/1
100 TABLET, EXTENDED RELEASE ORAL DAILY
Qty: 180 TABLET | Refills: 1 | Status: SHIPPED | OUTPATIENT
Start: 2023-04-03

## 2023-04-03 NOTE — TELEPHONE ENCOUNTER
Caller: [facility]    Relationship:PHARMACIST     Best call back number: [phone]    Requested Prescriptions:   Requested Prescriptions     Pending Prescriptions Disp Refills   • metoprolol succinate XL (TOPROL-XL) 50 MG 24 hr tablet 180 tablet 1     Sig: Take 2 tablets by mouth Daily.        Pharmacy where request should be sent: [facility] [place] [address] - [address] - [phone] [facility] [phone] FX     Last office visit with prescribing clinician: 5/3/2022   Last telemedicine visit with prescribing clinician: Visit date not found   Next office visit with prescribing clinician: Visit date not found       Does the patient have less than a 3 day supply:  [] Yes  [] No NOT SURE    Would you like a call back once the refill request has been completed: [] Yes [x] No    If the office needs to give you a call back, can they leave a voicemail: [] Yes [x] No    Eduardo Horton Rep   04/03/23 09:52 EDT

## 2023-06-13 ENCOUNTER — HOSPITAL ENCOUNTER (OUTPATIENT)
Dept: CT IMAGING | Facility: HOSPITAL | Age: 63
Discharge: HOME | End: 2023-06-13
Admitting: NURSE PRACTITIONER
Payer: MEDICARE

## 2023-06-13 DIAGNOSIS — R91.8 MULTIPLE PULMONARY NODULES: ICD-10-CM

## 2023-06-13 LAB — CREAT BLDA-MCNC: 0.9 MG/DL (ref 0.6–1.3)

## 2023-06-13 PROCEDURE — 71270 CT THORAX DX C-/C+: CPT

## 2023-06-13 PROCEDURE — 25510000001 IOPAMIDOL 61 % SOLUTION: Performed by: NURSE PRACTITIONER

## 2023-06-13 PROCEDURE — 71270 CT THORAX DX C-/C+: CPT | Performed by: RADIOLOGY

## 2023-06-13 PROCEDURE — 82565 ASSAY OF CREATININE: CPT

## 2023-06-13 RX ADMIN — IOPAMIDOL 70 ML: 612 INJECTION, SOLUTION INTRAVENOUS at 10:22

## (undated) DEVICE — LAPAROSCOPIC SCOPE WARMER: Brand: DEROYAL

## (undated) DEVICE — ANTIBACTERIAL VIOLET BRAIDED (POLYGLACTIN 910), SYNTHETIC ABSORBABLE SURGICAL SUTURE: Brand: COATED VICRYL

## (undated) DEVICE — MANIP UTER ADVINCULA DELINEATOR 3.0CM

## (undated) DEVICE — SUCTION CANISTER, 1500CC, RIGID: Brand: DEROYAL

## (undated) DEVICE — 3M™ STERI-STRIP™ REINFORCED ADHESIVE SKIN CLOSURES, R1547, 1/2 IN X 4 IN (12 MM X 100 MM), 6 STRIPS/ENVELOPE: Brand: 3M™ STERI-STRIP™

## (undated) DEVICE — ADHS LIQ MASTISOL 2/3ML

## (undated) DEVICE — TIP COVER ACCESSORY

## (undated) DEVICE — SEAL CANN CAM ENDOWRIST DAVINCI/S 8.5MM

## (undated) DEVICE — VIOLET BRAIDED (POLYGLACTIN 910), SYNTHETIC ABSORBABLE SUTURE: Brand: COATED VICRYL

## (undated) DEVICE — 40595 XL TRENDELENBURG POSITIONING KIT: Brand: 40595 XL TRENDELENBURG POSITIONING KIT

## (undated) DEVICE — CANNULA SEAL

## (undated) DEVICE — PK DAVINCI 70

## (undated) DEVICE — UNDYED BRAIDED (POLYGLACTIN 910), SYNTHETIC ABSORBABLE SUTURE: Brand: COATED VICRYL

## (undated) DEVICE — BNDG ADHS CURAD FLX/FABRC 2X4IN STRL LF

## (undated) DEVICE — ENDOPATH XCEL BLADELESS TROCARS WITH STABILITY SLEEVES: Brand: ENDOPATH XCEL

## (undated) DEVICE — Device

## (undated) DEVICE — CONN TBG Y 5 IN 1 LF STRL

## (undated) DEVICE — PAD GRND REM POLYHESIVE A/ DISP

## (undated) DEVICE — PAD SANI MAXI W/ADHS SNG WRP 11IN

## (undated) DEVICE — 2, DISPOSABLE SUCTION/IRRIGATOR WITH DISPOSABLE TIP: Brand: STRYKEFLOW

## (undated) DEVICE — OBT BLADLES ENDOWRIST DAVINCI/S 8MM

## (undated) DEVICE — ENCORE® LATEX MICRO SIZE 7.5, STERILE LATEX POWDER-FREE SURGICAL GLOVE: Brand: ENCORE

## (undated) DEVICE — SAFESECURE,SECUREMENT,FOLEY CATH,STERILE: Brand: MEDLINE

## (undated) DEVICE — APPL CHLORAPREP W/TINT 26ML ORNG